# Patient Record
Sex: FEMALE | Race: WHITE | NOT HISPANIC OR LATINO | Employment: OTHER | URBAN - NONMETROPOLITAN AREA
[De-identification: names, ages, dates, MRNs, and addresses within clinical notes are randomized per-mention and may not be internally consistent; named-entity substitution may affect disease eponyms.]

---

## 2017-01-23 ENCOUNTER — TRANSCRIBE ORDERS (OUTPATIENT)
Dept: ADMINISTRATIVE | Facility: HOSPITAL | Age: 82
End: 2017-01-23

## 2017-01-23 DIAGNOSIS — M48.07 LUMBOSACRAL STENOSIS: ICD-10-CM

## 2017-01-23 DIAGNOSIS — M48.061 SPINAL STENOSIS, LUMBAR REGION, WITHOUT NEUROGENIC CLAUDICATION: ICD-10-CM

## 2017-01-23 DIAGNOSIS — M46.26 OSTEOMYELITIS OF VERTEBRA OF LUMBAR REGION (HCC): Primary | ICD-10-CM

## 2017-01-23 DIAGNOSIS — M47.27 LUMBOSACRAL RADICULOPATHY DUE TO DEGENERATIVE JOINT DISEASE OF SPINE: ICD-10-CM

## 2017-01-24 ENCOUNTER — HOSPITAL ENCOUNTER (OUTPATIENT)
Dept: INTERVENTIONAL RADIOLOGY/VASCULAR | Facility: HOSPITAL | Age: 82
Discharge: HOME | End: 2017-01-24
Attending: PHYSICAL MEDICINE & REHABILITATION | Admitting: PHYSICAL MEDICINE & REHABILITATION

## 2017-01-24 VITALS
SYSTOLIC BLOOD PRESSURE: 137 MMHG | DIASTOLIC BLOOD PRESSURE: 48 MMHG | BODY MASS INDEX: 24.99 KG/M2 | HEIGHT: 65 IN | WEIGHT: 150 LBS | HEART RATE: 54 BPM | RESPIRATION RATE: 16 BRPM | OXYGEN SATURATION: 98 % | TEMPERATURE: 97.3 F

## 2017-01-24 DIAGNOSIS — M47.27 LUMBOSACRAL RADICULOPATHY DUE TO DEGENERATIVE JOINT DISEASE OF SPINE: ICD-10-CM

## 2017-01-24 DIAGNOSIS — M46.26 OSTEOMYELITIS OF VERTEBRA OF LUMBAR REGION (HCC): ICD-10-CM

## 2017-01-24 DIAGNOSIS — M48.07 LUMBOSACRAL STENOSIS: ICD-10-CM

## 2017-01-24 DIAGNOSIS — M48.061 SPINAL STENOSIS, LUMBAR REGION, WITHOUT NEUROGENIC CLAUDICATION: ICD-10-CM

## 2017-01-24 PROCEDURE — 25010000002 METHYLPREDNISOLONE PER 80 MG: Performed by: PHYSICAL MEDICINE & REHABILITATION

## 2017-01-24 RX ORDER — LIDOCAINE HYDROCHLORIDE 10 MG/ML
INJECTION, SOLUTION INFILTRATION; PERINEURAL
Status: COMPLETED | OUTPATIENT
Start: 2017-01-24 | End: 2017-01-24

## 2017-01-24 RX ORDER — 0.9 % SODIUM CHLORIDE 0.9 %
VIAL (ML) INJECTION
Status: COMPLETED | OUTPATIENT
Start: 2017-01-24 | End: 2017-01-24

## 2017-01-24 RX ORDER — METHYLPREDNISOLONE ACETATE 80 MG/ML
INJECTION, SUSPENSION INTRA-ARTICULAR; INTRALESIONAL; INTRAMUSCULAR; SOFT TISSUE
Status: COMPLETED | OUTPATIENT
Start: 2017-01-24 | End: 2017-01-24

## 2017-01-24 RX ADMIN — METHYLPREDNISOLONE ACETATE 80 MG: 80 INJECTION, SUSPENSION INTRA-ARTICULAR; INTRALESIONAL; INTRAMUSCULAR; SOFT TISSUE at 13:03

## 2017-01-24 RX ADMIN — SODIUM CHLORIDE 2 ML: 9 INJECTION INTRAMUSCULAR; INTRAVENOUS; SUBCUTANEOUS at 13:03

## 2017-01-24 RX ADMIN — LIDOCAINE HYDROCHLORIDE 5 ML: 10 INJECTION, SOLUTION INFILTRATION; PERINEURAL at 13:01

## 2017-01-24 RX ADMIN — SODIUM CHLORIDE 3 ML: 9 INJECTION INTRAMUSCULAR; INTRAVENOUS; SUBCUTANEOUS at 13:03

## 2017-01-24 NOTE — NURSING NOTE
Discharge instructions reviewed with patient, verbalizes understanding. Copy given to patient. Patient discharged.

## 2017-04-17 ENCOUNTER — TRANSCRIBE ORDERS (OUTPATIENT)
Dept: ADMINISTRATIVE | Facility: HOSPITAL | Age: 82
End: 2017-04-17

## 2017-04-17 DIAGNOSIS — M54.16 LUMBAR RADICULOPATHY: ICD-10-CM

## 2017-04-17 DIAGNOSIS — M54.50 LOW BACK PAIN POTENTIALLY ASSOCIATED WITH RADICULOPATHY: Primary | ICD-10-CM

## 2017-04-21 ENCOUNTER — HOSPITAL ENCOUNTER (OUTPATIENT)
Dept: MRI IMAGING | Facility: HOSPITAL | Age: 82
Discharge: HOME | End: 2017-04-21
Admitting: NURSE PRACTITIONER

## 2017-04-21 DIAGNOSIS — M54.16 LUMBAR RADICULOPATHY: ICD-10-CM

## 2017-04-21 DIAGNOSIS — M54.50 LOW BACK PAIN POTENTIALLY ASSOCIATED WITH RADICULOPATHY: ICD-10-CM

## 2017-04-21 PROCEDURE — 72148 MRI LUMBAR SPINE W/O DYE: CPT

## 2017-09-14 ENCOUNTER — TRANSCRIBE ORDERS (OUTPATIENT)
Dept: ADMINISTRATIVE | Facility: HOSPITAL | Age: 82
End: 2017-09-14

## 2017-09-14 DIAGNOSIS — M54.17 RADICULOPATHY, LUMBOSACRAL REGION: Primary | ICD-10-CM

## 2017-09-21 ENCOUNTER — HOSPITAL ENCOUNTER (OUTPATIENT)
Dept: MRI IMAGING | Facility: HOSPITAL | Age: 82
Discharge: HOME | End: 2017-09-21
Admitting: NURSE PRACTITIONER

## 2017-09-21 DIAGNOSIS — M54.17 RADICULOPATHY, LUMBOSACRAL REGION: ICD-10-CM

## 2017-09-21 PROCEDURE — 72148 MRI LUMBAR SPINE W/O DYE: CPT

## 2019-03-13 ENCOUNTER — HOSPITAL ENCOUNTER (OUTPATIENT)
Facility: HOSPITAL | Age: 84
Setting detail: OBSERVATION
Discharge: HOME HEALTH | End: 2019-03-14
Attending: EMERGENCY MEDICINE | Admitting: FAMILY MEDICINE

## 2019-03-13 ENCOUNTER — APPOINTMENT (OUTPATIENT)
Dept: GENERAL RADIOLOGY | Facility: HOSPITAL | Age: 84
End: 2019-03-13

## 2019-03-13 ENCOUNTER — APPOINTMENT (OUTPATIENT)
Dept: CT IMAGING | Facility: HOSPITAL | Age: 84
End: 2019-03-13

## 2019-03-13 DIAGNOSIS — I63.9 CEREBROVASCULAR ACCIDENT (CVA), UNSPECIFIED MECHANISM (HCC): Primary | ICD-10-CM

## 2019-03-13 DIAGNOSIS — R55 SYNCOPE AND COLLAPSE: ICD-10-CM

## 2019-03-13 DIAGNOSIS — R13.10 DYSPHAGIA, UNSPECIFIED TYPE: ICD-10-CM

## 2019-03-13 LAB
ALBUMIN SERPL-MCNC: 3.9 G/DL (ref 3.5–5)
ALBUMIN/GLOB SERPL: 1.7 G/DL (ref 1.1–2.5)
ALP SERPL-CCNC: 91 U/L (ref 24–120)
ALT SERPL W P-5'-P-CCNC: 36 U/L (ref 0–54)
ANION GAP SERPL CALCULATED.3IONS-SCNC: 8 MMOL/L (ref 4–13)
AST SERPL-CCNC: 73 U/L (ref 7–45)
BASOPHILS # BLD AUTO: 0.03 10*3/MM3 (ref 0–0.2)
BASOPHILS NFR BLD AUTO: 0.5 % (ref 0–2)
BILIRUB SERPL-MCNC: 0.8 MG/DL (ref 0.1–1)
BUN BLD-MCNC: 17 MG/DL (ref 5–21)
BUN/CREAT SERPL: 22.1 (ref 7–25)
CALCIUM SPEC-SCNC: 10.4 MG/DL (ref 8.4–10.4)
CHLORIDE SERPL-SCNC: 91 MMOL/L (ref 98–110)
CO2 SERPL-SCNC: 35 MMOL/L (ref 24–31)
CREAT BLD-MCNC: 0.77 MG/DL (ref 0.5–1.4)
DEPRECATED RDW RBC AUTO: 44 FL (ref 40–54)
EOSINOPHIL # BLD AUTO: 0.41 10*3/MM3 (ref 0–0.7)
EOSINOPHIL NFR BLD AUTO: 6.2 % (ref 0–4)
ERYTHROCYTE [DISTWIDTH] IN BLOOD BY AUTOMATED COUNT: 13.6 % (ref 12–15)
GFR SERPL CREATININE-BSD FRML MDRD: 70 ML/MIN/1.73
GLOBULIN UR ELPH-MCNC: 2.3 GM/DL
GLUCOSE BLD-MCNC: 98 MG/DL (ref 70–100)
HCT VFR BLD AUTO: 35 % (ref 37–47)
HGB BLD-MCNC: 11.2 G/DL (ref 12–16)
HOLD SPECIMEN: NORMAL
HOLD SPECIMEN: NORMAL
IMM GRANULOCYTES # BLD AUTO: 0.01 10*3/MM3 (ref 0–0.05)
IMM GRANULOCYTES NFR BLD AUTO: 0.2 % (ref 0–5)
INR PPP: 0.92 (ref 0.91–1.09)
LYMPHOCYTES # BLD AUTO: 2.61 10*3/MM3 (ref 0.72–4.86)
LYMPHOCYTES NFR BLD AUTO: 39.3 % (ref 15–45)
MCH RBC QN AUTO: 28.1 PG (ref 28–32)
MCHC RBC AUTO-ENTMCNC: 32 G/DL (ref 33–36)
MCV RBC AUTO: 87.9 FL (ref 82–98)
MONOCYTES # BLD AUTO: 0.87 10*3/MM3 (ref 0.19–1.3)
MONOCYTES NFR BLD AUTO: 13.1 % (ref 4–12)
NEUTROPHILS # BLD AUTO: 2.71 10*3/MM3 (ref 1.87–8.4)
NEUTROPHILS NFR BLD AUTO: 40.7 % (ref 39–78)
NRBC BLD AUTO-RTO: 0 /100 WBC (ref 0–0)
PLATELET # BLD AUTO: 252 10*3/MM3 (ref 130–400)
PMV BLD AUTO: 12 FL (ref 6–12)
POTASSIUM BLD-SCNC: 3.2 MMOL/L (ref 3.5–5.3)
PROT SERPL-MCNC: 6.2 G/DL (ref 6.3–8.7)
PROTHROMBIN TIME: 12.6 SECONDS (ref 11.9–14.6)
RBC # BLD AUTO: 3.98 10*6/MM3 (ref 4.2–5.4)
SODIUM BLD-SCNC: 134 MMOL/L (ref 135–145)
WBC NRBC COR # BLD: 6.64 10*3/MM3 (ref 4.8–10.8)
WHOLE BLOOD HOLD SPECIMEN: NORMAL
WHOLE BLOOD HOLD SPECIMEN: NORMAL

## 2019-03-13 PROCEDURE — G0378 HOSPITAL OBSERVATION PER HR: HCPCS

## 2019-03-13 PROCEDURE — 85025 COMPLETE CBC W/AUTO DIFF WBC: CPT | Performed by: EMERGENCY MEDICINE

## 2019-03-13 PROCEDURE — 93005 ELECTROCARDIOGRAM TRACING: CPT | Performed by: EMERGENCY MEDICINE

## 2019-03-13 PROCEDURE — 73070 X-RAY EXAM OF ELBOW: CPT

## 2019-03-13 PROCEDURE — 99284 EMERGENCY DEPT VISIT MOD MDM: CPT

## 2019-03-13 PROCEDURE — 73502 X-RAY EXAM HIP UNI 2-3 VIEWS: CPT

## 2019-03-13 PROCEDURE — 70450 CT HEAD/BRAIN W/O DYE: CPT

## 2019-03-13 PROCEDURE — 93010 ELECTROCARDIOGRAM REPORT: CPT | Performed by: INTERNAL MEDICINE

## 2019-03-13 PROCEDURE — 80053 COMPREHEN METABOLIC PANEL: CPT | Performed by: EMERGENCY MEDICINE

## 2019-03-13 PROCEDURE — 85610 PROTHROMBIN TIME: CPT | Performed by: EMERGENCY MEDICINE

## 2019-03-13 PROCEDURE — 72125 CT NECK SPINE W/O DYE: CPT

## 2019-03-13 PROCEDURE — 94799 UNLISTED PULMONARY SVC/PX: CPT

## 2019-03-13 PROCEDURE — 94760 N-INVAS EAR/PLS OXIMETRY 1: CPT

## 2019-03-13 RX ORDER — DULOXETIN HYDROCHLORIDE 60 MG/1
60 CAPSULE, DELAYED RELEASE ORAL 2 TIMES DAILY
COMMUNITY

## 2019-03-13 RX ORDER — LATANOPROST 50 UG/ML
1 SOLUTION/ DROPS OPHTHALMIC NIGHTLY
Status: DISCONTINUED | OUTPATIENT
Start: 2019-03-13 | End: 2019-03-14 | Stop reason: HOSPADM

## 2019-03-13 RX ORDER — ONDANSETRON 2 MG/ML
4 INJECTION INTRAMUSCULAR; INTRAVENOUS EVERY 6 HOURS PRN
Status: DISCONTINUED | OUTPATIENT
Start: 2019-03-13 | End: 2019-03-14 | Stop reason: HOSPADM

## 2019-03-13 RX ORDER — AMLODIPINE BESYLATE 10 MG/1
10 TABLET ORAL DAILY
COMMUNITY

## 2019-03-13 RX ORDER — OMEPRAZOLE 20 MG/1
20 CAPSULE, DELAYED RELEASE ORAL DAILY
COMMUNITY

## 2019-03-13 RX ORDER — OXYCODONE HCL 20 MG/1
20 TABLET, FILM COATED, EXTENDED RELEASE ORAL EVERY 12 HOURS SCHEDULED
Status: DISCONTINUED | OUTPATIENT
Start: 2019-03-13 | End: 2019-03-14 | Stop reason: HOSPADM

## 2019-03-13 RX ORDER — SODIUM CHLORIDE 0.9 % (FLUSH) 0.9 %
3-10 SYRINGE (ML) INJECTION AS NEEDED
Status: DISCONTINUED | OUTPATIENT
Start: 2019-03-13 | End: 2019-03-14 | Stop reason: HOSPADM

## 2019-03-13 RX ORDER — DORZOLAMIDE HCL 20 MG/ML
1 SOLUTION/ DROPS OPHTHALMIC 2 TIMES DAILY
COMMUNITY

## 2019-03-13 RX ORDER — ACETAMINOPHEN 325 MG/1
650 TABLET ORAL EVERY 4 HOURS PRN
Status: DISCONTINUED | OUTPATIENT
Start: 2019-03-13 | End: 2019-03-14 | Stop reason: HOSPADM

## 2019-03-13 RX ORDER — AMLODIPINE BESYLATE 5 MG/1
5 TABLET ORAL DAILY
Status: DISCONTINUED | OUTPATIENT
Start: 2019-03-13 | End: 2019-03-14 | Stop reason: HOSPADM

## 2019-03-13 RX ORDER — ASPIRIN 81 MG/1
81 TABLET ORAL DAILY
Status: DISCONTINUED | OUTPATIENT
Start: 2019-03-13 | End: 2019-03-14

## 2019-03-13 RX ORDER — ATORVASTATIN CALCIUM 40 MG/1
40 TABLET, FILM COATED ORAL NIGHTLY
Status: DISCONTINUED | OUTPATIENT
Start: 2019-03-13 | End: 2019-03-14 | Stop reason: HOSPADM

## 2019-03-13 RX ORDER — DORZOLAMIDE HCL 20 MG/ML
1 SOLUTION/ DROPS OPHTHALMIC 3 TIMES DAILY
Status: DISCONTINUED | OUTPATIENT
Start: 2019-03-13 | End: 2019-03-14 | Stop reason: HOSPADM

## 2019-03-13 RX ORDER — DONEPEZIL HYDROCHLORIDE 10 MG/1
10 TABLET, FILM COATED ORAL NIGHTLY
Status: DISCONTINUED | OUTPATIENT
Start: 2019-03-13 | End: 2019-03-14 | Stop reason: HOSPADM

## 2019-03-13 RX ORDER — PREGABALIN 75 MG/1
150 CAPSULE ORAL EVERY 12 HOURS SCHEDULED
Status: DISCONTINUED | OUTPATIENT
Start: 2019-03-13 | End: 2019-03-14 | Stop reason: HOSPADM

## 2019-03-13 RX ORDER — PREGABALIN 150 MG/1
150 CAPSULE ORAL 4 TIMES DAILY
Status: ON HOLD | COMMUNITY
End: 2019-03-14 | Stop reason: SDUPTHER

## 2019-03-13 RX ORDER — DONEPEZIL HYDROCHLORIDE 10 MG/1
10 TABLET, FILM COATED ORAL NIGHTLY
COMMUNITY

## 2019-03-13 RX ORDER — ATENOLOL 25 MG/1
12.5 TABLET ORAL DAILY
Status: DISCONTINUED | OUTPATIENT
Start: 2019-03-13 | End: 2019-03-14 | Stop reason: HOSPADM

## 2019-03-13 RX ORDER — DULOXETIN HYDROCHLORIDE 30 MG/1
60 CAPSULE, DELAYED RELEASE ORAL DAILY
Status: DISCONTINUED | OUTPATIENT
Start: 2019-03-13 | End: 2019-03-14 | Stop reason: HOSPADM

## 2019-03-13 RX ORDER — POTASSIUM CHLORIDE 750 MG/1
40 CAPSULE, EXTENDED RELEASE ORAL ONCE
Status: COMPLETED | OUTPATIENT
Start: 2019-03-13 | End: 2019-03-13

## 2019-03-13 RX ORDER — HYDROCHLOROTHIAZIDE 25 MG/1
25 TABLET ORAL DAILY
COMMUNITY

## 2019-03-13 RX ORDER — PANTOPRAZOLE SODIUM 40 MG/1
40 TABLET, DELAYED RELEASE ORAL EVERY MORNING
Status: DISCONTINUED | OUTPATIENT
Start: 2019-03-14 | End: 2019-03-14 | Stop reason: HOSPADM

## 2019-03-13 RX ORDER — ATENOLOL 25 MG/1
12.5 TABLET ORAL DAILY
COMMUNITY

## 2019-03-13 RX ORDER — POTASSIUM CHLORIDE 750 MG/1
10 TABLET, FILM COATED, EXTENDED RELEASE ORAL 2 TIMES DAILY
COMMUNITY

## 2019-03-13 RX ORDER — SODIUM CHLORIDE 0.9 % (FLUSH) 0.9 %
3 SYRINGE (ML) INJECTION EVERY 12 HOURS SCHEDULED
Status: DISCONTINUED | OUTPATIENT
Start: 2019-03-13 | End: 2019-03-14 | Stop reason: HOSPADM

## 2019-03-13 RX ORDER — ETODOLAC 400 MG/1
400 TABLET, FILM COATED ORAL 2 TIMES DAILY
COMMUNITY

## 2019-03-13 RX ORDER — LATANOPROST 50 UG/ML
1 SOLUTION/ DROPS OPHTHALMIC NIGHTLY
COMMUNITY

## 2019-03-13 RX ADMIN — ACETAMINOPHEN 650 MG: 325 TABLET, FILM COATED ORAL at 21:07

## 2019-03-13 RX ADMIN — POTASSIUM CHLORIDE 40 MEQ: 750 CAPSULE, EXTENDED RELEASE ORAL at 21:20

## 2019-03-13 RX ADMIN — SODIUM CHLORIDE, PRESERVATIVE FREE 3 ML: 5 INJECTION INTRAVENOUS at 21:08

## 2019-03-13 RX ADMIN — LATANOPROST 1 DROP: 50 SOLUTION OPHTHALMIC at 21:07

## 2019-03-13 RX ADMIN — DONEPEZIL HYDROCHLORIDE 10 MG: 10 TABLET, FILM COATED ORAL at 21:06

## 2019-03-13 RX ADMIN — OXYCODONE HYDROCHLORIDE 20 MG: 20 TABLET, FILM COATED, EXTENDED RELEASE ORAL at 22:42

## 2019-03-13 RX ADMIN — ATORVASTATIN CALCIUM 40 MG: 40 TABLET, FILM COATED ORAL at 21:06

## 2019-03-13 RX ADMIN — PREGABALIN 150 MG: 75 CAPSULE ORAL at 22:42

## 2019-03-13 RX ADMIN — ASPIRIN 81 MG: 81 TABLET, DELAYED RELEASE ORAL at 21:06

## 2019-03-13 NOTE — H&P
[facility] Medicine Services  HISTORY AND PHYSICAL    Date of Admission: 3/13/2019  Primary Care Physician: Provider, No Known    Subjective     Chief Complaint: Syncope    History of Present Illness   Miss Bella is a 89 year old lady with a history of osteoarthritis, hypertension, and alzheimer's dementia.  She passed out yesterday and fell.  She went to see her doctor in [facility] and they ordered an MRI.  This showed a subacute stroke and she was transferred to the [facility] ER for further evaluation.    She denies focal weakness of deficit.  She denies facial drooping or difficulty speaking.        Review of Systems   Unable to perform ROS: Dementia          Otherwise complete ROS reviewed and negative except as mentioned in the HPI.    Past Medical History:   Past Medical History:   Diagnosis Date   • Alzheimer disease    • Glaucoma    • Hypertension    • Macular degeneration    • Osteoarthritis    • Sciatica      Past Surgical History:No past surgical history on file.  Social History:  reports that  has never smoked. She does not have any smokeless tobacco history on file. She reports that she does not drink alcohol or use drugs.    Family History: Dementia    Allergies:  Allergies   Allergen Reactions   • Meperidine        Medications:  Prior to Admission medications    Medication Sig Start Date End Date Taking? Authorizing Provider   amLODIPine (NORVASC) 10 MG tablet Take 10 mg by mouth Daily.   Yes Monika Roth MD   atenolol (TENORMIN) 25 MG tablet Take 12.5 mg by mouth Daily.   Yes Monika Roth MD   donepezil (ARICEPT) 10 MG tablet Take 10 mg by mouth Every Night.   Yes Monika Roth MD   dorzolamide (TRUSOPT) 2 % ophthalmic solution 1 drop 3 (Three) Times a Day.   Yes Monika Roth MD   DULoxetine (CYMBALTA) 60 MG capsule Take 60 mg by mouth Daily.   Yes Monika Roth MD   etodolac (LODINE) 400 MG tablet Take 400 mg by  "mouth 2 (Two) Times a Day.   Yes Monika Roth MD   hydrochlorothiazide (HYDRODIURIL) 25 MG tablet Take 25 mg by mouth Daily.   Yes Provider, Historical, MD   latanoprost (XALATAN) 0.005 % ophthalmic solution 1 drop Every Night.   Yes Provider, Historical, MD   omeprazole (priLOSEC) 20 MG capsule Take 20 mg by mouth Daily.   Yes Monika Roth MD   oxyCODONE ER 18 MG capsule extended-release 12 hour  Take  by mouth.   Yes Monika Roth MD   potassium chloride (K-DUR) 10 MEQ CR tablet Take 10 mEq by mouth 2 (Two) Times a Day.   Yes Provider, Historical, MD   pregabalin (LYRICA) 100 MG capsule Take 150 mg by mouth 2 (Two) Times a Day.   Yes Provider, Historical, MD   Probiotic Product (PROBIOTIC-10) capsule Take 1 capsule by mouth Daily.   Yes Provider, Historical, MD   Zolpidem Tartrate 10 MG sublingual tablet Place  under the tongue.   Yes Monika Roth MD     Objective     Vital Signs: /78   Pulse 59   Temp 98.1 °F (36.7 °C) (Oral)   Resp 16   Ht 165.1 cm (65\")   Wt 62.1 kg (137 lb)   SpO2 95%   BMI 22.80 kg/m²   Physical Exam   Constitutional: She appears well-developed and well-nourished.   HENT:   Head: Normocephalic and atraumatic.   Right Ear: External ear normal.   Left Ear: External ear normal.   Nose: Nose normal.   Mouth/Throat: Oropharynx is clear and moist.   Eyes: Conjunctivae and EOM are normal.   Neck: Normal range of motion. Neck supple.   Cardiovascular: Normal rate, regular rhythm and normal heart sounds.   Pulmonary/Chest: Effort normal and breath sounds normal.   Abdominal: Soft. Bowel sounds are normal. She exhibits no distension. There is no tenderness.   Musculoskeletal: Normal range of motion.   Neurological: She is alert. She has normal strength and normal reflexes. She is disoriented. She displays no tremor. No cranial nerve deficit. She exhibits normal muscle tone. She displays no seizure activity. Coordination normal.   Skin: Skin is warm and " dry.   Psychiatric: She has a normal mood and affect. Her speech is normal and behavior is normal. Cognition and memory are impaired.           Results Reviewed:  Lab Results (last 24 hours)     Procedure Component Value Units Date/Time    [place]g Draw [[id number]] Collected:  03/13/19 1709    Specimen:  Blood Updated:  03/13/19 1816    Narrative:       The following orders were created for panel order Amberg Draw.  Procedure                               Abnormality         Status                     ---------                               -----------         ------                     Light Blue Top[[id number]]                                    Final result               Green Top (Gel)[[id number]]                                   Final result               Lavender Top[[id number]]                                      Final result               Red Top[[id number]]                                           Final result                 Please view results for these tests on the individual orders.    Light Blue Top [[id number]] Collected:  03/13/19 1709    Specimen:  Blood Updated:  03/13/19 1816     Extra Tube hold for add-on     Comment: Auto resulted       Green Top (Gel) [[id number]] Collected:  03/13/19 1709    Specimen:  Blood Updated:  03/13/19 1816     Extra Tube Hold for add-ons.     Comment: Auto resulted.       Lavender Top [[id number]] Collected:  03/13/19 1709    Specimen:  Blood Updated:  03/13/19 1816     Extra Tube hold for add-on     Comment: Auto resulted       Red Top [[id number]] Collected:  03/13/19 1709    Specimen:  Blood Updated:  03/13/19 1816     Extra Tube Hold for add-ons.     Comment: Auto resulted.       Comprehensive Metabolic Panel [[id number]]  (Abnormal) Collected:  03/13/19 1709    Specimen:  Blood Updated:  03/13/19 1739     Glucose 98 mg/dL      BUN 17 mg/dL      Creatinine 0.77 mg/dL      Sodium 134 mmol/L      Potassium 3.2 mmol/L      Chloride 91 mmol/L      CO2 35.0 mmol/L      Calcium 10.4  mg/dL      Total Protein 6.2 g/dL      Albumin 3.90 g/dL      ALT (SGPT) 36 U/L      AST (SGOT) 73 U/L      Alkaline Phosphatase 91 U/L      Total Bilirubin 0.8 mg/dL      eGFR Non African Amer 70 mL/min/1.73      Globulin 2.3 gm/dL      A/G Ratio 1.7 g/dL      BUN/Creatinine Ratio 22.1     Anion Gap 8.0 mmol/L     Narrative:       GFR Normal >60  Chronic Kidney Disease <60  Kidney Failure <15    Protime-INR [[id number]]  (Normal) Collected:  03/13/19 1709    Specimen:  Blood Updated:  03/13/19 1736     Protime 12.6 Seconds      INR 0.92    CBC & Differential [[id number]] Collected:  03/13/19 1709    Specimen:  Blood Updated:  03/13/19 1726    Narrative:       The following orders were created for panel order CBC & Differential.  Procedure                               Abnormality         Status                     ---------                               -----------         ------                     CBC Auto Differential[[id number]]         Abnormal            Final result                 Please view results for these tests on the individual orders.    CBC Auto Differential [[id number]]  (Abnormal) Collected:  03/13/19 1709    Specimen:  Blood Updated:  03/13/19 1726     WBC 6.64 10*3/mm3      RBC 3.98 10*6/mm3      Hemoglobin 11.2 g/dL      Hematocrit 35.0 %      MCV 87.9 fL      MCH 28.1 pg      MCHC 32.0 g/dL      RDW 13.6 %      RDW-SD 44.0 fl      MPV 12.0 fL      Platelets 252 10*3/mm3      Neutrophil % 40.7 %      Lymphocyte % 39.3 %      Monocyte % 13.1 %      Eosinophil % 6.2 %      Basophil % 0.5 %      Immature Grans % 0.2 %      Neutrophils, Absolute 2.71 10*3/mm3      Lymphocytes, Absolute 2.61 10*3/mm3      Monocytes, Absolute 0.87 10*3/mm3      Eosinophils, Absolute 0.41 10*3/mm3      Basophils, Absolute 0.03 10*3/mm3      Immature Grans, Absolute 0.01 10*3/mm3      nRBC 0.0 /100 WBC         Imaging Results (last 24 hours)     ** No results found for the last 24 hours. **        I have personally reviewed  and interpreted the radiology studies and ECG obtained at time of admission.       MRI at OSH report:      FLAIR study demonstrates obvious edema in the anterior temporal horn on the right side that is a subacute brain injury, probably 4 or 5 days old, that involved the anterior temporal horn.    Assessment / Plan     Assessment:   Active Hospital Problems    Diagnosis   • Cerebrovascular accident (CVA) (CMS/[facility])     1.  Subacute CVA  -ASA  -Lipitor  -Consult Neuro  -SLP  -PT/OT  -Echo  -Carotids  -Lipid Panel  -A1C    2.  Fall/trauma  -Will do CT Head/Neck   -XR right hip/elbow    3.  Syncope  -Orthostatics  -Echo    4.  HTN  -Norvasc  -Atenolol  -HCTZ    5.  Hypokalemia  -PO K    6.  GERD  -Protonix    7.  Glaucoma  -Continue eye drops    Code Status:      I discussed the patient's findings and my recommendations with the patient, patient's family    Estimated length of stay 1-2 days    Kareem Aguilar MD   03/13/19   6:39 PM

## 2019-03-13 NOTE — ED PROVIDER NOTES
Subjective   PT WITH FALL SYNCOPE WITH         Cerebrovascular Accident   The primary symptoms include loss of consciousness, altered mental status, loss of sensation and speech change. Primary symptoms do not include dizziness, paresthesias, nausea or vomiting. The symptoms began 6 to 12 hours ago.   The symptoms are improving.   Additional symptoms do not include neck stiffness, weakness, pain, hallucinations, nystagmus, taste disturbance, vertigo, anxiety, irritability or dysphoric mood. Medical issues also include cerebral vascular accident. Medical issues do not include hypertension or recent surgery. Workup history includes MRI.       Review of Systems   Constitutional: Negative.  Negative for irritability.   HENT: Negative.    Eyes: Negative.    Respiratory: Negative.    Cardiovascular: Negative.    Gastrointestinal: Negative.  Negative for nausea and vomiting.   Musculoskeletal: Negative.  Negative for back pain, neck pain and neck stiffness.   Skin: Negative.    Neurological: Positive for speech change and loss of consciousness. Negative for dizziness, vertigo, weakness and paresthesias.   Psychiatric/Behavioral: Negative for dysphoric mood and hallucinations.   All other systems reviewed and are negative.      No past medical history on file.    Allergies   Allergen Reactions   • Meperidine        No past surgical history on file.    No family history on file.    Social History     Socioeconomic History   • Marital status:      Spouse name: Not on file   • Number of children: Not on file   • Years of education: Not on file   • Highest education level: Not on file           Objective   Physical Exam   Constitutional: She is oriented to person, place, and time. She appears well-developed and well-nourished.   HENT:   Head: Normocephalic.   Right Ear: External ear normal.   Eyes: Conjunctivae are normal. Pupils are equal, round, and reactive to light.   Neck: Normal range of motion. Neck supple.    Cardiovascular: Normal rate, regular rhythm, normal heart sounds and intact distal pulses. PMI is not displaced. Exam reveals no decreased pulses.   No murmur heard.  Pulmonary/Chest: Effort normal and breath sounds normal. No accessory muscle usage. No tachypnea. No respiratory distress. She has no decreased breath sounds. She has no wheezes. She has no rales. She exhibits no tenderness.   Abdominal: Soft. Bowel sounds are normal. There is no tenderness.   Musculoskeletal: Normal range of motion. She exhibits no edema or tenderness.   Lower extremity exam bilaterally is unremarkable.  There is no right or left calf tenderness .  There is no palpable venous cord.  No obvious difference in the size of the legs.  No pitting edema.  The dorsalis pedis and posterior tibial femoral and popliteal pulses are palpable and +2 bilaterally.  Homans sign is negative   Neurological: She is alert and oriented to person, place, and time. She has normal reflexes. No cranial nerve deficit.   Skin: Skin is warm. No rash noted. No erythema.   Nursing note and vitals reviewed.      Procedures           ED Course  ED Course as of Mar 13 1803   Wed Mar 13, 2019   1801 Patient with a history of CVA on the MRI done at outlying facility sent over here for evaluation and had a near syncopal episode versus syncope at home was found on the floor MRI was done today consistent with a subacute stroke I have discussed this case the family and with the hospitalist will admit the patient to hospital for further evaluation   [TS]      ED Course User Index  [TS] Suleman Overton MD                  [facility]      Final diagnoses:   Cerebrovascular accident (CVA), unspecified mechanism (CMS/[facility])   Syncope and collapse            Suleman Overton MD  03/13/19 1804

## 2019-03-14 ENCOUNTER — APPOINTMENT (OUTPATIENT)
Dept: ULTRASOUND IMAGING | Facility: HOSPITAL | Age: 84
End: 2019-03-14

## 2019-03-14 VITALS
TEMPERATURE: 98.2 F | SYSTOLIC BLOOD PRESSURE: 143 MMHG | OXYGEN SATURATION: 91 % | HEIGHT: 65 IN | HEART RATE: 70 BPM | DIASTOLIC BLOOD PRESSURE: 61 MMHG | WEIGHT: 137 LBS | RESPIRATION RATE: 16 BRPM | BODY MASS INDEX: 22.82 KG/M2

## 2019-03-14 PROBLEM — G30.1 DEMENTIA OF THE ALZHEIMER'S TYPE WITH LATE ONSET WITHOUT BEHAVIORAL DISTURBANCE (HCC): Status: ACTIVE | Noted: 2019-03-14

## 2019-03-14 PROBLEM — I10 HTN (HYPERTENSION): Status: ACTIVE | Noted: 2019-03-14

## 2019-03-14 PROBLEM — E87.6 HYPOKALEMIA: Status: ACTIVE | Noted: 2019-03-14

## 2019-03-14 PROBLEM — R90.0 INTRACRANIAL SPACE-OCCUPYING LESION: Status: ACTIVE | Noted: 2019-03-14

## 2019-03-14 PROBLEM — F02.80 DEMENTIA OF THE ALZHEIMER'S TYPE WITH LATE ONSET WITHOUT BEHAVIORAL DISTURBANCE (HCC): Status: ACTIVE | Noted: 2019-03-14
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
## 2019-03-14 PROBLEM — H40.9 GLAUCOMA: Status: ACTIVE | Noted: 2019-03-14

## 2019-03-14 PROBLEM — S52.041A: Status: ACTIVE | Noted: 2019-03-14

## 2019-03-14 LAB
ANION GAP SERPL CALCULATED.3IONS-SCNC: 6 MMOL/L (ref 4–13)
ARTICHOKE IGE QN: 51 MG/DL (ref 0–99)
BUN BLD-MCNC: 15 MG/DL (ref 5–21)
BUN/CREAT SERPL: 22.1 (ref 7–25)
CALCIUM SPEC-SCNC: 9.8 MG/DL (ref 8.4–10.4)
CHLORIDE SERPL-SCNC: 98 MMOL/L (ref 98–110)
CHOLEST SERPL-MCNC: 110 MG/DL (ref 130–200)
CO2 SERPL-SCNC: 35 MMOL/L (ref 24–31)
CREAT BLD-MCNC: 0.68 MG/DL (ref 0.5–1.4)
DEPRECATED RDW RBC AUTO: 44.8 FL (ref 40–54)
ERYTHROCYTE [DISTWIDTH] IN BLOOD BY AUTOMATED COUNT: 13.7 % (ref 12–15)
GFR SERPL CREATININE-BSD FRML MDRD: 81 ML/MIN/1.73
GLUCOSE BLD-MCNC: 95 MG/DL (ref 70–100)
HBA1C MFR BLD: 5 %
HCT VFR BLD AUTO: 30.9 % (ref 37–47)
HDLC SERPL-MCNC: 48 MG/DL
HGB BLD-MCNC: 9.9 G/DL (ref 12–16)
LDLC/HDLC SERPL: 1.1 {RATIO}
MCH RBC QN AUTO: 28.6 PG (ref 28–32)
MCHC RBC AUTO-ENTMCNC: 32 G/DL (ref 33–36)
MCV RBC AUTO: 89.3 FL (ref 82–98)
PLATELET # BLD AUTO: 226 10*3/MM3 (ref 130–400)
PMV BLD AUTO: 12.1 FL (ref 6–12)
POTASSIUM BLD-SCNC: 3.7 MMOL/L (ref 3.5–5.3)
RBC # BLD AUTO: 3.46 10*6/MM3 (ref 4.2–5.4)
SODIUM BLD-SCNC: 139 MMOL/L (ref 135–145)
TRIGL SERPL-MCNC: 46 MG/DL (ref 0–149)
WBC NRBC COR # BLD: 4.64 10*3/MM3 (ref 4.8–10.8)

## 2019-03-14 PROCEDURE — 93880 EXTRACRANIAL BILAT STUDY: CPT | Performed by: SURGERY

## 2019-03-14 PROCEDURE — 93880 EXTRACRANIAL BILAT STUDY: CPT

## 2019-03-14 PROCEDURE — 80061 LIPID PANEL: CPT | Performed by: NURSE PRACTITIONER

## 2019-03-14 PROCEDURE — 83036 HEMOGLOBIN GLYCOSYLATED A1C: CPT | Performed by: NURSE PRACTITIONER

## 2019-03-14 PROCEDURE — G0378 HOSPITAL OBSERVATION PER HR: HCPCS

## 2019-03-14 PROCEDURE — 85027 COMPLETE CBC AUTOMATED: CPT | Performed by: NURSE PRACTITIONER

## 2019-03-14 PROCEDURE — 99219 PR INITIAL OBSERVATION CARE/DAY 50 MINUTES: CPT | Performed by: NEUROLOGICAL SURGERY

## 2019-03-14 PROCEDURE — 99204 OFFICE O/P NEW MOD 45 MIN: CPT | Performed by: PSYCHIATRY & NEUROLOGY

## 2019-03-14 PROCEDURE — 80048 BASIC METABOLIC PNL TOTAL CA: CPT | Performed by: NURSE PRACTITIONER

## 2019-03-14 PROCEDURE — 92610 EVALUATE SWALLOWING FUNCTION: CPT | Performed by: SPEECH-LANGUAGE PATHOLOGIST

## 2019-03-14 RX ORDER — PHENOL 1.4 %
600 AEROSOL, SPRAY (ML) MUCOUS MEMBRANE DAILY
COMMUNITY

## 2019-03-14 RX ORDER — PREGABALIN 150 MG/1
150 CAPSULE ORAL 2 TIMES DAILY
Start: 2019-03-14

## 2019-03-14 RX ORDER — MULTIPLE VITAMINS W/ MINERALS TAB 9MG-400MCG
1 TAB ORAL DAILY
COMMUNITY

## 2019-03-14 RX ADMIN — AMLODIPINE BESYLATE 5 MG: 5 TABLET ORAL at 10:22

## 2019-03-14 RX ADMIN — PANTOPRAZOLE SODIUM 40 MG: 40 TABLET, DELAYED RELEASE ORAL at 10:24

## 2019-03-14 RX ADMIN — SODIUM CHLORIDE, PRESERVATIVE FREE 3 ML: 5 INJECTION INTRAVENOUS at 11:25

## 2019-03-14 RX ADMIN — DORZOLAMIDE HYDROCHLORIDE 1 DROP: 20 SOLUTION/ DROPS OPHTHALMIC at 11:22

## 2019-03-14 RX ADMIN — PREGABALIN 150 MG: 75 CAPSULE ORAL at 10:21

## 2019-03-14 RX ADMIN — OXYCODONE HYDROCHLORIDE 20 MG: 20 TABLET, FILM COATED, EXTENDED RELEASE ORAL at 10:22

## 2019-03-14 RX ADMIN — DULOXETINE HYDROCHLORIDE 60 MG: 30 CAPSULE, DELAYED RELEASE ORAL at 10:23

## 2019-03-14 RX ADMIN — ATENOLOL 12.5 MG: 25 TABLET ORAL at 10:22

## 2019-03-14 NOTE — PROGRESS NOTES
Discharge Planning Assessment   [place]     Patient Name: Felisha Bella  MRN: [id number]  Today's Date: 3/14/2019    Admit Date: 3/13/2019    Discharge Needs Assessment     Row Name 03/14/19 1525       Living Environment    Lives With  alone    Current Living Arrangements  home/apartment/condo    Primary Care Provided by  self;child(edison)    Provides Primary Care For  no one, unable/limited ability to care for self    Family Caregiver if Needed  child(edison), adult    Family Caregiver Names  Kimi    Quality of Family Relationships  helpful;involved;supportive    Able to Return to Prior Arrangements  yes       Resource/Environmental Concerns    Resource/Environmental Concerns  none       Transition Planning    Patient/Family Anticipates Transition to  home with family;home with help/services    Patient/Family Anticipated Services at Transition  home health care    Transportation Anticipated  family or friend will provide       Discharge Needs Assessment    Readmission Within the Last 30 Days  no previous admission in last 30 days    Concerns to be Addressed  adjustment to diagnosis/illness;care coordination/care conferences    Equipment Currently Used at Home  cane, straight;wheelchair;walker, rolling    Outpatient/Agency/Support Group Needs  homecare agency    Discharge Facility/Level of Care Needs  home with home health    Current Discharge Risk  lives alone    Discharge Coordination/Progress  Pt lives at home alone and is followed by [facility]. Confirmed with Janie [phone]. Daughter is going to stay with pt at night for awhile until she is able to be on her own. Plan is for pt to return home at d/c.         Discharge Plan    No documentation.       Destination      No service coordination in this encounter.      Durable Medical Equipment      No service coordination in this encounter.      Dialysis/Infusion      No service coordination in this encounter.      Home Medical Care      Service Provider  Request Status Selected Services Address Phone Number Fax Number    [facility] Pending - No Request Sent N/A [address] [place] [address] [phone]      Community Resources      No service coordination in this encounter.          Demographic Summary    No documentation.       Functional Status    No documentation.       Psychosocial    No documentation.       Abuse/Neglect    No documentation.       Legal    No documentation.       Substance Abuse    No documentation.       Patient Forms    No documentation.           CHELSEA Gonsales

## 2019-03-14 NOTE — PLAN OF CARE
Problem: Patient Care Overview  Goal: Plan of Care Review  Outcome: Ongoing (interventions implemented as appropriate)   03/14/19 0659   Coping/Psychosocial   Plan of Care Reviewed With patient;daughter   Plan of Care Review   Progress no change   OTHER   Outcome Summary Pt oriented x4 NIH =0, Tele on, up x1 to BSC. VSS, safety maintained, slight coughing noted after sips of water and pills, NPO status initated, speech consult placed per protocol, will continue to monitor       Problem: Fall Risk (Adult)  Goal: Identify Related Risk Factors and Signs and Symptoms  Outcome: Outcome(s) achieved Date Met: 03/14/19    Goal: Absence of Fall  Outcome: Ongoing (interventions implemented as appropriate)      Problem: Stroke (Ischemic) (Adult)  Goal: Signs and Symptoms of Listed Potential Problems Will be Absent, Minimized or Managed (Stroke)  Outcome: Ongoing (interventions implemented as appropriate)

## 2019-03-14 NOTE — PLAN OF CARE
Problem: Patient Care Overview  Goal: Plan of Care Review  Outcome: Ongoing (interventions implemented as appropriate)   03/14/19 1112   Coping/Psychosocial   Plan of Care Reviewed With patient;daughter;other (see comments)  (NOEL Sahu )   Plan of Care Review   Progress no change  (Initial Evaluation )   OTHER   Outcome Summary Clinical swallow evaluation completed. Patient was alert and cooperative sitting in chair. Daughter's present during evaluation. Oral motor assessment revealed age related generalized weakness which was functional for patient. Patient completed regular solid trials and thin liquid trials. No overt s/s were observed. Functional mastication of solid trial with clearance of residue post swallow. ST cannot fully rule out aspiration but feels that patient is not at a high risk when standard swallow precautions are followed. ST recommends: 1) Regular diet 2) Thin liquids 3) Meds whole with thin liquids. ST to sign off at this time. If concerns arise please reconsult ST.

## 2019-03-14 NOTE — PLAN OF CARE
Problem: Patient Care Overview  Goal: Plan of Care Review  Outcome: Ongoing (interventions implemented as appropriate)   03/14/19 1051   Coping/Psychosocial   Plan of Care Reviewed With other (see comments)   Plan of Care Review   Progress no change   OTHER   Outcome Summary OT screening completed. Spoke with PT, family reports pt is at baseline function and is being discharged today with HH. OT to defer eval.

## 2019-03-14 NOTE — CONSULTS
Neurology Consult Note    Referring Provider: Dr. MARTHA Aguilar  Reason for Consultation: confusion      History of present illness:      This is a 89-year-old female.  She is somewhat of a poor historian secondary to severe Alzheimer's disease; however, her daughter is at the bedside and assist in the history.  Reportedly the patient lives alone but is checked on frequently by family.  She was found 2 days ago on the floor.  She was confused and had urinary incontinence.  She was seen at an outside facility where there was concern for an ischemic stroke.  She underwent an MRI which once again showed some concern in the right sylvian fissure.  She was then transferred here for further evaluation.  Neurosurgery has Denis seen her and is declared that there are no signs of ischemic stroke but instead some subacute blood products in the right sylvian fissure.  The patient has no focal findings.  She remains somewhat confused which is her baseline.  She has no slurred speech.  She has no unilateral motor deficits.  She has no unilateral sensory deficits.  She has no facial drooping.      Past Medical History:   Diagnosis Date   • Alzheimer disease    • Glaucoma    • Hypertension    • Macular degeneration    • Osteoarthritis    • Sciatica        Allergies   Allergen Reactions   • Meperidine      No current facility-administered medications on file prior to encounter.      Current Outpatient Medications on File Prior to Encounter   Medication Sig   • amLODIPine (NORVASC) 10 MG tablet Take 10 mg by mouth Daily.   • atenolol (TENORMIN) 25 MG tablet Take 12.5 mg by mouth Daily.   • donepezil (ARICEPT) 10 MG tablet Take 10 mg by mouth Every Night.   • dorzolamide (TRUSOPT) 2 % ophthalmic solution 1 drop 3 (Three) Times a Day.   • DULoxetine (CYMBALTA) 60 MG capsule Take 60 mg by mouth Daily.   • etodolac (LODINE) 400 MG tablet Take 400 mg by mouth 2 (Two) Times a Day.   • hydrochlorothiazide (HYDRODIURIL) 25 MG tablet Take 25 mg  by mouth Daily.   • latanoprost (XALATAN) 0.005 % ophthalmic solution 1 drop Every Night.   • omeprazole (priLOSEC) 20 MG capsule Take 20 mg by mouth Daily.   • oxyCODONE ER 18 MG capsule extended-release 12 hour  Take 1 tablet by mouth 2 (Two) Times a Day.   • potassium chloride (K-DUR) 10 MEQ CR tablet Take 10 mEq by mouth 2 (Two) Times a Day.   • pregabalin (LYRICA) 100 MG capsule Take 150 mg by mouth 2 (Two) Times a Day.   • Probiotic Product (PROBIOTIC-10) capsule Take 1 capsule by mouth Daily.   • Zolpidem Tartrate 10 MG sublingual tablet Place 10 mg under the tongue Every Night.       Social History     Socioeconomic History   • Marital status:      Spouse name: Not on file   • Number of children: Not on file   • Years of education: Not on file   • Highest education level: Not on file   Social Needs   • Financial resource strain: Not on file   • Food insecurity - worry: Not on file   • Food insecurity - inability: Not on file   • Transportation needs - medical: Not on file   • Transportation needs - non-medical: Not on file   Occupational History   • Not on file   Tobacco Use   • Smoking status: Never Smoker   Substance and Sexual Activity   • Alcohol use: No     Frequency: Never   • Drug use: No   • Sexual activity: Defer   Other Topics Concern   • Not on file   Social History Narrative   • Not on file     Family History   Problem Relation Age of Onset   • Heart disease Mother    • Alzheimer's disease Sister    • Heart disease Brother    • Cancer Brother        Review of Systems  A 14 point review of systems was reviewed and was negative except for confusion    Vital Signs   Temp:  [98 °F (36.7 °C)-98.5 °F (36.9 °C)] 98.2 °F (36.8 °C)  Heart Rate:  [59-70] 70  Resp:  [16-18] 16  BP: (109-163)/(49-78) 143/61    General Exam:  Head:  Normal cephalic, atraumatic  HEENT:  Neck supple  Fundoscopic Exam:  No signs of disc edema  CVS:  Regular rate and rhythm.  No murmurs  Carotid Examination:  No  bruits  Lungs:  Clear to auscultation  Abdomen:  Non-tender, Non-distended  Extremities:  No signs of peripheral edema  Skin:  No rashes    Neurologic Exam:    Mental Status:    -Awake, Alert, Oriented X 3  -No word finding difficulties  -No aphasia  -No dysarthria  -Follows simple and complex commands    CN II:  Visual fields full.  Pupils equally reactive to light  CN III, IV, VI:  Extraocular Muscles full with no signs of nystagmus  CN V:  Facial sensory is symmetric with no asymmetries.  CN VII:  Facial motor symmetric  CN VIII:  Gross hearing intact bilaterally  CN IX:  Palate elevates symmetrically  CN X:  Palate elevates symmetrically  CN XI:  Shoulder shrug symmetric  CN XII:  Tongue is midline on protrusion    Motor:     She is moving all extremities but has some antalgic weakness secondary to widespread osteoarthritis.    DTR:  -Right   Bicep: 2+ Tricep: 2+ Brachoradialis: 2+   Patella: 2+ Ankle: 2+ Neg Babinski  -Left   Bicep: 2+ Tricep: 2+ Brachoradialis: 2+   Patella: 2+ Ankle: 2+ Neg Babinski    Sensory:  -Intact to light touch, pinprick, temperature, pain, and proprioception    Coordination:  -No gross ataxia        Results Review:  Lab Results (last 24 hours)     Procedure Component Value Units Date/Time    Basic Metabolic Panel [[id number]]  (Abnormal) Collected:  03/14/19 0612    Specimen:  Blood Updated:  03/14/19 0709     Glucose 95 mg/dL      BUN 15 mg/dL      Creatinine 0.68 mg/dL      Sodium 139 mmol/L      Potassium 3.7 mmol/L      Chloride 98 mmol/L      CO2 35.0 mmol/L      Calcium 9.8 mg/dL      eGFR Non African Amer 81 mL/min/1.73      BUN/Creatinine Ratio 22.1     Anion Gap 6.0 mmol/L     Narrative:       GFR Normal >60  Chronic Kidney Disease <60  Kidney Failure <15    Lipid Panel [[id number]]  (Abnormal) Collected:  03/14/19 0612    Specimen:  Blood Updated:  03/14/19 0709     Total Cholesterol 110 mg/dL      Triglycerides 46 mg/dL      HDL Cholesterol 48 mg/dL      LDL Cholesterol  51  mg/dL      LDL/HDL Ratio 1.10    Hemoglobin A1c [[id number]] Collected:  03/14/19 0612    Specimen:  Blood Updated:  03/14/19 0708     Hemoglobin A1C 5.0 %     Narrative:       Less than 6.0           Non-Diabetic Range  6.0-7.0                 ADA Therapeutic Target  Greater than 7.0        Action Suggested    CBC (No Diff) [[id number]]  (Abnormal) Collected:  03/14/19 0612    Specimen:  Blood Updated:  03/14/19 0635     WBC 4.64 10*3/mm3      RBC 3.46 10*6/mm3      Hemoglobin 9.9 g/dL      Hematocrit 30.9 %      MCV 89.3 fL      MCH 28.6 pg      MCHC 32.0 g/dL      RDW 13.7 %      RDW-SD 44.8 fl      MPV 12.1 fL      Platelets 226 10*3/mm3     [place] Draw [[id number]] Collected:  03/13/19 1709    Specimen:  Blood Updated:  03/13/19 1816    Narrative:       The following orders were created for panel order [facility] [facility].  Procedure                               Abnormality         Status                     ---------                               -----------         ------                     Light Blue Top[[id number]]                                    Final result               Green Top (Gel)[[id number]]                                   Final result               Lavender Top[[id number]]                                      Final result               Red Top[[id number]]                                           Final result                 Please view results for these tests on the individual orders.    Light Blue Top [[id number]] Collected:  03/13/19 1709    Specimen:  Blood Updated:  03/13/19 1816     Extra Tube hold for add-on     Comment: Auto resulted       Green Top (Gel) [[id number]] Collected:  03/13/19 1709    Specimen:  Blood Updated:  03/13/19 1816     Extra Tube Hold for add-ons.     Comment: Auto resulted.       Lavender Top [[id number]] Collected:  03/13/19 1709    Specimen:  Blood Updated:  03/13/19 1816     Extra Tube hold for add-on     Comment: Auto resulted       Red Top [[id number]] Collected:  03/13/19 1709     Specimen:  Blood Updated:  03/13/19 1816     Extra Tube Hold for add-ons.     Comment: Auto resulted.       Comprehensive Metabolic Panel [[id number]]  (Abnormal) Collected:  03/13/19 1709    Specimen:  Blood Updated:  03/13/19 1739     Glucose 98 mg/dL      BUN 17 mg/dL      Creatinine 0.77 mg/dL      Sodium 134 mmol/L      Potassium 3.2 mmol/L      Chloride 91 mmol/L      CO2 35.0 mmol/L      Calcium 10.4 mg/dL      Total Protein 6.2 g/dL      Albumin 3.90 g/dL      ALT (SGPT) 36 U/L      AST (SGOT) 73 U/L      Alkaline Phosphatase 91 U/L      Total Bilirubin 0.8 mg/dL      eGFR Non African Amer 70 mL/min/1.73      Globulin 2.3 gm/dL      A/G Ratio 1.7 g/dL      BUN/Creatinine Ratio 22.1     Anion Gap 8.0 mmol/L     Narrative:       GFR Normal >60  Chronic Kidney Disease <60  Kidney Failure <15    Protime-INR [[id number]]  (Normal) Collected:  03/13/19 1709    Specimen:  Blood Updated:  03/13/19 1736     Protime 12.6 Seconds      INR 0.92    CBC & Differential [[id number]] Collected:  03/13/19 1709    Specimen:  Blood Updated:  03/13/19 1726    Narrative:       The following orders were created for panel order CBC & Differential.  Procedure                               Abnormality         Status                     ---------                               -----------         ------                     CBC Auto Differential[[id number]]         Abnormal            Final result                 Please view results for these tests on the individual orders.    CBC Auto Differential [[id number]]  (Abnormal) Collected:  03/13/19 1709    Specimen:  Blood Updated:  03/13/19 1726     WBC 6.64 10*3/mm3      RBC 3.98 10*6/mm3      Hemoglobin 11.2 g/dL      Hematocrit 35.0 %      MCV 87.9 fL      MCH 28.1 pg      MCHC 32.0 g/dL      RDW 13.6 %      RDW-SD 44.0 fl      MPV 12.0 fL      Platelets 252 10*3/mm3      Neutrophil % 40.7 %      Lymphocyte % 39.3 %      Monocyte % 13.1 %      Eosinophil % 6.2 %      Basophil % 0.5 %      Immature  Grans % 0.2 %      Neutrophils, Absolute 2.71 10*3/mm3      Lymphocytes, Absolute 2.61 10*3/mm3      Monocytes, Absolute 0.87 10*3/mm3      Eosinophils, Absolute 0.41 10*3/mm3      Basophils, Absolute 0.03 10*3/mm3      Immature Grans, Absolute 0.01 10*3/mm3      nRBC 0.0 /100 WBC           .  Imaging Results (last 24 hours)     Procedure Component Value Units Date/Time    US Carotid Bilateral [[id number]] Updated:  03/14/19 0736    XR Hip With or Without Pelvis 2 - 3 View Right [[id number]] Collected:  03/13/19 2223     Updated:  03/13/19 2227    Narrative:       EXAMINATION:  XR HIP W OR WO PELVIS 2-3 VIEW RIGHT-  3/13/2019 7:59 PM  CDT     HISTORY: fall, hip pain. With pelvis; I63.9-Cerebral infarction,  unspecified; R55-Syncope and collapse      COMPARISON: No comparison study.     TECHNIQUE: 3 views: AP pelvis, AP and lateral right hip     FINDINGS:      There are osteoarthritic changes observed in the right hip. There is  osteophyte formation along the acetabulum and femoral head neck  junction. There is no fracture or dislocation.     Degenerative changes to lesser noted in the left hip. There is no  fracture.     The bony pelvis is intact.     Advanced degenerative changes noted in the lower lumbar spine.       Impression:       1. Degenerative changes.  2. No acute osseous abnormality.  This report was finalized on 03/13/2019 22:24 by Dr. Tereso Brothers MD.    XR Elbow 2 View Right [[id number]] Collected:  03/13/19 2221     Updated:  03/13/19 2226    Narrative:       EXAMINATION:  XR ELBOW 2 VW RIGHT-  3/13/2019 7:58 PM CDT     HISTORY: Fall, pain; I63.9-Cerebral infarction, unspecified; R55-Syncope  and collapse      COMPARISON: No comparison study.     TECHNIQUE: 2 views: AP and lateral projection imaging     FINDINGS:      There is mild linear bony irregularity appreciated on the lateral view,  near the coronoid process which may reflect degenerative spurring there  is observed on the frontal view. Minimal  avulsion injury not excluded.  No additional fracture identified.     Joint effusion questioned.       Impression:       1. Question mild fracture changes noted in the region of the coronoid  process on the lateral view. Correlate with physical findings.  2. Joint effusion suspected.  This report was finalized on 03/13/2019 22:23 by Dr. Tereso Brothers MD.    CT Head Without Contrast [[id number]] Collected:  03/13/19 2146     Updated:  03/13/19 2156    Narrative:       EXAMINATION: CT HEAD WO CONTRAST-  3/13/2019 9:46 PM CDT     CT SCAN OF THE HEAD, WITHOUT CONTRAST:      HISTORY: Stroke observed on outside MR     COMPARISONS: 7/31/2016 head CT      TECHNIQUE:     Radiation dose equals  mGy-cm.  Automated exposure control dose  reduction technique was implemented.        CT evaluation of the head without intravenous contrast. 5 mm transaxial  images were obtained.   2-D sagittal and coronal reconstruction images  were generated.     FINDINGS:      Along the right temporal lobe cortex anteriorly and the insular cortex,  anteriorly near the sylvian fissure there is increased attenuation and  mild cortical hemorrhage questioned. Correlate with outside MR findings.     There is no additional CT changes of acute hemorrhage.     There is central and cortical atrophy. There is low-attenuation changes  in the periventricular and subcortical white matter compatible with  chronic ischemic change.     There is no mass effect or midline shift.     There is no hydrocephalus.     The paranasal sinuses are clear.     No acute osseous abnormalities identified.             Impression:       1. Subtle increased attenuation worrisome for mild cortical hemorrhage  in the right temporal lobe anteriorly and along the sylvian fissure in  the insular cortex. Correlate with patient presentation and outside MR  imaging   2. Atrophy and chronic ischemic changes.           This report was finalized on 03/13/2019 21:52 by Dr. Tereso Brothers  MD.    CT Cervical Spine Without Contrast [[id number]] Collected:  03/13/19 2133     Updated:  03/13/19 2139    Narrative:       EXAMINATION:  CT CERVICAL SPINE WO CONTRAST-  3/13/2019 7:33 PM CDT     HISTORY: fall; I63.9-Cerebral infarction, unspecified; R55-Syncope and  collapse      COMPARISON: No comparison study.     TECHNIQUE:      Radiation dose equals  mGy-cm.  Automated exposure control dose  reduction technique was implemented.     Thin section axial images were obtained. 2-D sagittal coronal  reconstructions were generated.     FINDINGS:      There is marked lordosis of the cervical spine with marked kyphosis of  the thoracic spine.     The facets are appropriately aligned.     Vertebral body heights are maintained without definite fracture or acute  subluxation.     There is multilevel disc degeneration particularly C4-C5 with mild  anterolisthesis of C4 on C5. There is disc degeneration at C6-C7 with  vacuum phenomenon with minimal anterolisthesis of C6 on C7.     There is mild anterolisthesis of C7 on T1.     There is no significant canal stenosis.     There is no CT evidence of posttraumatic disc herniation.     There are degenerative changes in the density 1 articulation with  osteophyte formation.       Impression:       1. No CT evidence of acute fracture/subluxation of the cervical spine.  2. Marked lordosis of the cervical spine and kyphosis of the thoracic  spine.  3. Multilevel Disc degeneration and spondylosis.  This report was finalized on 03/13/2019 21:36 by Dr. Tereso Brothers MD.        CT of head reviewed by me.  In addition to that the MRI of the brain is reviewed as well.  Is negative for diffusion ab normalities.  There is a small hyper focus of blood in the right sylvian fissure likely consistent with subacute blood.  There is no mass-effect.      Impression    89-year-old female with end-stage dementia presenting with likely a small subarachnoid hemorrhage secondary to a fall.  I  see no evidence of acute ischemic strokes.  From a neurology perspective I recommend that the patient have 24-hour supervision moving forward secondary to her severe dementia.  I also recommend discharge back to her normal setting as quickly as possible as I think her chance of inpatient psychosis is externally high.  No further stroke workup warranted at this time.    Plan    · Cleared from neurology standpoint to be discharged home    I discussed the patients findings and my recommendations with patient and family    Tay Casey MD  03/14/19  11:11 AM

## 2019-03-14 NOTE — DISCHARGE SUMMARY
[facility] Medicine Services  DISCHARGE SUMMARY       Date of Admission: 3/13/2019  Date of Discharge:  3/14/2019  Primary Care Physician: Provider, No Known    Presenting Problem/History of Present Illness:  Fall, likely secondary to dementia    Final Discharge Diagnoses:  Active Hospital Problems    Diagnosis   • Intracranial space-occupying lesion     Favor venous subarachnoid hemorrhage, traumatic hemorrhage, or cerebral contusion per neurosurgery         • Dementia of the Alzheimer's type with late onset without behavioral disturbance   • Closed fracture of coronoid process of right ulna   • HTN (hypertension)   • Fall   • Glaucoma   • Hypokalemia   • Cerebrovascular accident (CVA) (CMS/[facility])     Consults:   1.  Dr. Mario Alberto Bailey-neurosurgery  2.  Dr. Tay Casey-neurology  3.  Dr. Will Jamison-orthopedic surgery    Procedures Performed: None    Pertinent Test Results:   Lab Results (all)     Procedure Component Value Units Date/Time    Basic Metabolic Panel [[id number]]  (Abnormal) Collected:  03/14/19 0612    Specimen:  Blood Updated:  03/14/19 0709     Glucose 95 mg/dL      BUN 15 mg/dL      Creatinine 0.68 mg/dL      Sodium 139 mmol/L      Potassium 3.7 mmol/L      Chloride 98 mmol/L      CO2 35.0 mmol/L      Calcium 9.8 mg/dL      eGFR Non African Amer 81 mL/min/1.73      BUN/Creatinine Ratio 22.1     Anion Gap 6.0 mmol/L     Lipid Panel [[id number]]  (Abnormal) Collected:  03/14/19 0612    Specimen:  Blood Updated:  03/14/19 0709     Total Cholesterol 110 mg/dL      Triglycerides 46 mg/dL      HDL Cholesterol 48 mg/dL      LDL Cholesterol  51 mg/dL      LDL/HDL Ratio 1.10    Hemoglobin A1c [[id number]] Collected:  03/14/19 0612    Specimen:  Blood Updated:  03/14/19 0708     Hemoglobin A1C 5.0 %     CBC (No Diff) [[id number]]  (Abnormal) Collected:  03/14/19 0612    Specimen:  Blood Updated:  03/14/19 0635     WBC 4.64 10*3/mm3      RBC 3.46 10*6/mm3      Hemoglobin 9.9  g/dL      Hematocrit 30.9 %      MCV 89.3 fL      MCH 28.6 pg      MCHC 32.0 g/dL      RDW 13.7 %      RDW-SD 44.8 fl      MPV 12.1 fL      Platelets 226 10*3/mm3      Comment: Auto resulted.       Comprehensive Metabolic Panel [[id number]]  (Abnormal) Collected:  03/13/19 1709    Specimen:  Blood Updated:  03/13/19 1739     Glucose 98 mg/dL      BUN 17 mg/dL      Creatinine 0.77 mg/dL      Sodium 134 mmol/L      Potassium 3.2 mmol/L      Chloride 91 mmol/L      CO2 35.0 mmol/L      Calcium 10.4 mg/dL      Total Protein 6.2 g/dL      Albumin 3.90 g/dL      ALT (SGPT) 36 U/L      AST (SGOT) 73 U/L      Alkaline Phosphatase 91 U/L      Total Bilirubin 0.8 mg/dL      eGFR Non African Amer 70 mL/min/1.73      Globulin 2.3 gm/dL      A/G Ratio 1.7 g/dL      BUN/Creatinine Ratio 22.1     Anion Gap 8.0 mmol/L     Protime-INR [[id number]]  (Normal) Collected:  03/13/19 1709    Specimen:  Blood Updated:  03/13/19 1736     Protime 12.6 Seconds      INR 0.92    CBC Auto Differential [[id number]]  (Abnormal) Collected:  03/13/19 1709    Specimen:  Blood Updated:  03/13/19 1726     WBC 6.64 10*3/mm3      RBC 3.98 10*6/mm3      Hemoglobin 11.2 g/dL      Hematocrit 35.0 %      MCV 87.9 fL      MCH 28.1 pg      MCHC 32.0 g/dL      RDW 13.6 %      RDW-SD 44.0 fl      MPV 12.0 fL      Platelets 252 10*3/mm3      Neutrophil % 40.7 %      Lymphocyte % 39.3 %      Monocyte % 13.1 %      Eosinophil % 6.2 %      Basophil % 0.5 %      Immature Grans % 0.2 %      Neutrophils, Absolute 2.71 10*3/mm3      Lymphocytes, Absolute 2.61 10*3/mm3      Monocytes, Absolute 0.87 10*3/mm3      Eosinophils, Absolute 0.41 10*3/mm3      Basophils, Absolute 0.03 10*3/mm3      Immature Grans, Absolute 0.01 10*3/mm3      nRBC 0.0 /100 WBC         Imaging Results (all)     Procedure Component Value Units Date/Time    US Carotid Bilateral [[id number]] Collected:  03/14/19 1321     Updated:  03/14/19 1324    Narrative:       History: Carotid occlusive disease        Impression:       Impression:  1. There is less than 50% stenosis of the right internal carotid artery.  2. There is less than 50% stenosis of the left internal carotid artery.  3. Antegrade flow is demonstrated in bilateral vertebral arteries.     Comments: Bilateral carotid vertebral arterial duplex scan was  performed.     Grayscale imaging shows intimal thickening and calcified elements at the  carotid bifurcation. The right internal carotid artery peak systolic  velocity is 75.0 cm/sec. The end-diastolic velocity is 19.9 cm/sec. The  right ICA/CCA ratio is approximately 0.64 . These findings correlate  with less than 50% stenosis of the right internal carotid artery.     Grayscale imaging shows intimal thickening and calcified elements at the  carotid bifurcation. The left internal carotid artery peak systolic  velocity is 60.3 cm/sec. The end-diastolic velocity is 16.5 cm/sec. The  left ICA/CCA ratio is approximately 0.76 . These findings correlate with  less than 50% stenosis of the left internal carotid artery.     Antegrade flow is demonstrated in bilateral vertebral arteries.       This report was finalized on 03/14/2019 13:21 by Dr. Allen Salamanca MD.    XR Hip With or Without Pelvis 2 - 3 View Right [[id number]] Collected:  03/13/19 2223     Updated:  03/13/19 2227    Narrative:       EXAMINATION:  XR HIP W OR WO PELVIS 2-3 VIEW RIGHT-  3/13/2019 7:59 PM  CDT     HISTORY: fall, hip pain. With pelvis; I63.9-Cerebral infarction,  unspecified; R55-Syncope and collapse      COMPARISON: No comparison study.     TECHNIQUE: 3 views: AP pelvis, AP and lateral right hip     FINDINGS:      There are osteoarthritic changes observed in the right hip. There is  osteophyte formation along the acetabulum and femoral head neck  junction. There is no fracture or dislocation.     Degenerative changes to lesser noted in the left hip. There is no  fracture.     The bony pelvis is intact.     Advanced degenerative changes  noted in the lower lumbar spine.       Impression:       1. Degenerative changes.  2. No acute osseous abnormality.  This report was finalized on 03/13/2019 22:24 by Dr. Tereso Brothers MD.    XR Elbow 2 View Right [[id number]] Collected:  03/13/19 2221     Updated:  03/13/19 2226    Narrative:       EXAMINATION:  XR ELBOW 2 VW RIGHT-  3/13/2019 7:58 PM CDT     HISTORY: Fall, pain; I63.9-Cerebral infarction, unspecified; R55-Syncope  and collapse      COMPARISON: No comparison study.     TECHNIQUE: 2 views: AP and lateral projection imaging     FINDINGS:      There is mild linear bony irregularity appreciated on the lateral view,  near the coronoid process which may reflect degenerative spurring there  is observed on the frontal view. Minimal avulsion injury not excluded.  No additional fracture identified.     Joint effusion questioned.       Impression:       1. Question mild fracture changes noted in the region of the coronoid  process on the lateral view. Correlate with physical findings.  2. Joint effusion suspected.  This report was finalized on 03/13/2019 22:23 by Dr. Tereso Brothers MD.    CT Head Without Contrast [[id number]] Collected:  03/13/19 2146     Updated:  03/13/19 2156    Narrative:       EXAMINATION: CT HEAD WO CONTRAST-  3/13/2019 9:46 PM CDT     CT SCAN OF THE HEAD, WITHOUT CONTRAST:      HISTORY: Stroke observed on outside MR     COMPARISONS: 7/31/2016 head CT      TECHNIQUE:     Radiation dose equals  mGy-cm.  Automated exposure control dose  reduction technique was implemented.        CT evaluation of the head without intravenous contrast. 5 mm transaxial  images were obtained.   2-D sagittal and coronal reconstruction images  were generated.     FINDINGS:      Along the right temporal lobe cortex anteriorly and the insular cortex,  anteriorly near the sylvian fissure there is increased attenuation and  mild cortical hemorrhage questioned. Correlate with outside MR findings.     There is no  additional CT changes of acute hemorrhage.     There is central and cortical atrophy. There is low-attenuation changes  in the periventricular and subcortical white matter compatible with  chronic ischemic change.     There is no mass effect or midline shift.     There is no hydrocephalus.     The paranasal sinuses are clear.     No acute osseous abnormalities identified.             Impression:       1. Subtle increased attenuation worrisome for mild cortical hemorrhage  in the right temporal lobe anteriorly and along the sylvian fissure in  the insular cortex. Correlate with patient presentation and outside MR  imaging   2. Atrophy and chronic ischemic changes.           This report was finalized on 03/13/2019 21:52 by Dr. Tereso Brothers MD.    CT Cervical Spine Without Contrast [[id number]] Collected:  03/13/19 2133     Updated:  03/13/19 2139    Narrative:       EXAMINATION:  CT CERVICAL SPINE WO CONTRAST-  3/13/2019 7:33 PM CDT     HISTORY: fall; I63.9-Cerebral infarction, unspecified; R55-Syncope and  collapse      COMPARISON: No comparison study.     TECHNIQUE:      Radiation dose equals  mGy-cm.  Automated exposure control dose  reduction technique was implemented.     Thin section axial images were obtained. 2-D sagittal coronal  reconstructions were generated.     FINDINGS:      There is marked lordosis of the cervical spine with marked kyphosis of  the thoracic spine.     The facets are appropriately aligned.     Vertebral body heights are maintained without definite fracture or acute  subluxation.     There is multilevel disc degeneration particularly C4-C5 with mild  anterolisthesis of C4 on C5. There is disc degeneration at C6-C7 with  vacuum phenomenon with minimal anterolisthesis of C6 on C7.     There is mild anterolisthesis of C7 on T1.     There is no significant canal stenosis.     There is no CT evidence of posttraumatic disc herniation.     There are degenerative changes in the density 1  articulation with  osteophyte formation.       Impression:       1. No CT evidence of acute fracture/subluxation of the cervical spine.  2. Marked lordosis of the cervical spine and kyphosis of the thoracic  spine.  3. Multilevel Disc degeneration and spondylosis.  This report was finalized on 03/13/2019 21:36 by Dr. Tereso Brothers MD.        History of Present Illness on Day of Discharge: The patient is resting in bed with daughter at bedside.  Her daughter reports that she is back at her baseline.     Hospital Course:  Ms. Bella is a very pleasant 89-year-old  female past medical history significant for hypertension, Alzheimer's, and osteoarthritis.  The patient's family found the patient following a fall at home yesterday.  She went to see her primary care provider and an MRI was ordered.  They felt this showed a subacute stroke and she was transferred to our facility on 3/13 for further evaluation.  She was admitted to the hospitalist service for further evaluation and management.    We did perform a CT of the head on admission, which showed subtle increased attenuation worrisome for mild cortical hemorrhage in the right temporal lobe.  CT of the cervical spine was negative for acute injury.  X-ray of the right hip was negative for acute injury.  X-ray of the right elbow showed questionable mild fracture changes noted in the region of the coronoid process.    The patient was seen in consultation by neurosurgery.  They do feel that the MRI was more consistent with intracranial lesion rather than an acute stroke.  They do favor venous subarachnoid hemorrhage, traumatic hemorrhage, or cerebral contusion.  This would be consistent given her fall.  They felt the risk outweighed the benefits of performing CTA to rule out aneurysm because if an aneurysm were found, no intervention would be undertaken given her age.  The patient was seen in consultation by orthopedic surgery as well.  There was no need for  "surgical intervention or splinting for the right coronoid chip fragment fracture to the right elbow.  The patient was seen in consultation by physical and occupational therapy who feel the patient is at her functional baseline.    The patient's family feels very strongly that the patient did not have a syncopal episode causing her fall.  We did offer to perform an echocardiogram to look for any valvular abnormalities which could have contributed to her fall.  Her family declines at this time, as no intervention would be pursued should she have any valvular abnormalities.    Overall, the patient is hemodynamically stable and appropriate for discharge home to resume home health services today.    Condition on Discharge:  Stable    Physical Exam on Discharge:  /61 (BP Location: Right arm, Patient Position: Lying)   Pulse 70   Temp 98.2 °F (36.8 °C) (Oral)   Resp 16   Ht 165.1 cm (65\")   Wt 62.1 kg (137 lb)   SpO2 91%   BMI 22.80 kg/m²   Physical Exam      Discharge Disposition:  Home-Health Care [facility]    Discharge Medications:     Discharge Medications      Changes to Medications      Instructions Start Date   pregabalin 150 MG capsule  Commonly known as:  LYRICA  What changed:  when to take this  Notes to patient:  Next dose due 3-14 tonight   150 mg, Oral, 2 Times Daily         Continue These Medications      Instructions Start Date   amLODIPine 10 MG tablet  Commonly known as:  NORVASC  Notes to patient:  Next dose due 3-15 am   10 mg, Oral, Daily      atenolol 25 MG tablet  Commonly known as:  TENORMIN  Notes to patient:  Next dose due 3-15 am   12.5 mg, Oral, Daily      calcium carbonate 600 MG tablet  Commonly known as:  OS-BARBIE  Notes to patient:  Next dose due 3-15 am   600 mg, Oral, Daily      donepezil 10 MG tablet  Commonly known as:  ARICEPT  Notes to patient:  Next dose due tonight 3-14   10 mg, Oral, Nightly      dorzolamide 2 % ophthalmic solution  Commonly known as:  TRUSOPT  Notes to patient: "  Next dose due tonight 3-14   1 drop, Both Eyes, 2 Times Daily      DULoxetine 60 MG capsule  Commonly known as:  CYMBALTA  Notes to patient:  Next dose due tonight 3-14   60 mg, Oral, 2 Times Daily      etodolac 400 MG tablet  Commonly known as:  LODINE  Notes to patient:  Next dose due tonight   400 mg, Oral, 2 Times Daily      hydrochlorothiazide 25 MG tablet  Commonly known as:  HYDRODIURIL  Notes to patient:  Next dose due 3-15 am   25 mg, Oral, Daily      latanoprost 0.005 % ophthalmic solution  Commonly known as:  XALATAN  Notes to patient:  Next dose due tonight 3-14   1 drop, Both Eyes, Nightly      multivitamin with minerals tablet tablet  Notes to patient:  Next dose due 3-15 am   1 tablet, Oral, Daily      omeprazole 20 MG capsule  Commonly known as:  priLOSEC  Notes to patient:  Next dose due 3-15 am   20 mg, Oral, Daily      potassium chloride 10 MEQ CR tablet  Commonly known as:  K-DUR  Notes to patient:  Next dose due tonight 3-14   10 mEq, Oral, 2 Times Daily      PROBIOTIC-10 capsule  Notes to patient:  Next dose due 3-15 am   1 capsule, Oral, Daily      XTAMPZA ER 18 MG capsule extended-release 12 hour   Generic drug:  oxyCODONE ER  Notes to patient:  Next dose due tonight 3-14   18 mg, Oral, Every 12 Hours           Discharge Diet:   Diet Instructions     Diet: Cardiac; Thin      Discharge Diet:  Cardiac    Fluid Consistency:  Thin        Activity at Discharge:   Activity Instructions     Activity as Tolerated          Discharge Care Plan/Instructions:   1.  Return for any acute or worsening symptoms  2.  Resume home health services for skilled nursing    Follow-up Appointments:   1.  Primary care provider in 1 week  2.  May follow-up with Dr. Bailey and Dr. Jamison as needed    Test Results Pending at Discharge: None    Linn Edgar, JUAN M  03/14/19  5:08 PM    Time: 25 minutes

## 2019-03-14 NOTE — CONSULTS
NEUROSURGERY INITIAL HOSPITAL ENCOUNTER    Assessment/Plan:   Felisha Bella is a 89 y.o. female with a significant comorbidity Alzheimer's.  She presents with a new problem of found down at home with worsening confusion. Physical exam findings of neurologically at her baseline with confusion but otherwise intact with diffuse weakness.  Their imaging shows hyperdensity in the right sylvian fissure consistent with blood products on her MRI.    Differential Diagnosis:   Altered mental status: Alzheimer's versus metabolic encephalopathy, favor Alzheimer's.  Intracranial lesion: Favor venous subarachnoid hemorrhage, traumatic hemorrhage, or cerebral contusion.  Much less likely cerebral aneurysm    Recommendations:  Felisha is a very pleasant 89-year-old female who is currently not on anticoagulation with a long-standing history of Alzheimer's.  She had a fall at home and was found down.  She appears to be at her neurological baseline.  Her intracranial lesion is not consistent with stroke but rather a small amount of venous subarachnoid hemorrhage.  This is consistent with a fall from standing height given her age and level of cortical atrophy.  While we cannot fully exclude aneurysm I believe that the risk of a CTA is not inconsequential.  Additionally even if an aneurysm is found, no intervention would be taking given her age and a 4% risk of rupture per year.  The family understands the risk and favor taking her home.  She is already beginning to show signs of confusion and disorientation, and has been some of her pain medication.  I believe the risk of progression of her bleed is very minimal and agree with discharge home at her earliest possible convenience.    I discussed the patients findings and my recommendations with patient, family and consulting provider    Thank you very much for this interesting consult.     Level of Risk: High due to:  illness with threat to life or bodily function and abrupt change in  neurological status  MDM: High Complexity  Mod = 12945, High=99223  ___________________________________________________________________    Reason for consult intracranial blood products    Chief Complaint:   Chief Complaint   Patient presents with   • Cerebrovascular Accident       HPI: Miss Bella is a 89 year old lady with a history of osteoarthritis, hypertension, and alzheimer's dementia.    She has a long-standing history of Alzheimer's.  However she does live alone with family support.  I will acquainted with family as I remove the tumor from her son-in-law.  Regardless she failed to respond to telephone calls yesterday and therefore her daughters came and checked on her.  She was found on the floor, more or less in her normal state of confusion.  She had urinated on herself.  She was taken to her primary care physician who ordered an MRI.  This was read as a subacute stroke but on my review is more consistent with subarachnoid venous hemorrhage in the sylvian fissure on the right.  There is no evidence of diffusion restriction.    Regardless given the imaging findings the patient was transferred to [facility] emergency room and was admitted to the hospitalist service for altered mental status and evaluation of stroke.  A CT scan was ordered which showed a subacute subarachnoid bleed in the right sylvian fissure.    Her daughters were at bedside and states that she is now at her baseline.  She does not complain of head pain.  She has no nausea or vomiting. She denies focal weakness of deficit.  She denies facial drooping or difficulty speaking.    She does have baseline confusion.  She is able to move all of her extremities without difficulty.  She is not on any anticoagulation.       Review of Systems   Constitutional: Negative.    HENT: Negative.    Eyes: Negative.    Respiratory: Negative.    Cardiovascular: Negative.    Gastrointestinal: Negative.    Endocrine: Negative.    Genitourinary: Negative.     Musculoskeletal: Positive for gait problem.   Skin: Negative.    Allergic/Immunologic: Negative.    Neurological: Positive for syncope.   Hematological: Negative.    Psychiatric/Behavioral: Positive for confusion.        Past Medical History:  has a past medical history of Alzheimer disease, Glaucoma, Hypertension, Macular degeneration, Osteoarthritis, and Sciatica.    Past Surgical History:  has no past surgical history on file.    Family History: family history includes Alzheimer's disease in her sister; Cancer in her brother; Heart disease in her brother and mother.    Social History:  reports that  has never smoked. She does not have any smokeless tobacco history on file. She reports that she does not drink alcohol or use drugs.    Allergies: Meperidine    Home Medications:   Current Facility-Administered Medications:   •  acetaminophen (TYLENOL) tablet 650 mg, 650 mg, Oral, Q4H PRN, Woeltz, Linn K, APRN, 650 mg at 03/13/19 2107  •  amLODIPine (NORVASC) tablet 5 mg, 5 mg, Oral, Daily, Woeltz, Linn K, APRN  •  atenolol (TENORMIN) tablet 12.5 mg, 12.5 mg, Oral, Daily, Woeltz, Linn K, APRN  •  atorvastatin (LIPITOR) tablet 40 mg, 40 mg, Oral, Nightly, Woeltz, Linn K, APRN, 40 mg at 03/13/19 2106  •  donepezil (ARICEPT) tablet 10 mg, 10 mg, Oral, Nightly, Woeltz, Linn K, APRN, 10 mg at 03/13/19 2106  •  dorzolamide (TRUSOPT) 2 % ophthalmic solution 1 drop, 1 drop, Both Eyes, TID, Woeltz, Linn K, APRN  •  DULoxetine (CYMBALTA) DR capsule 60 mg, 60 mg, Oral, Daily, Woeltz, Linn K, APRN  •  latanoprost (XALATAN) 0.005 % ophthalmic solution 1 drop, 1 drop, Both Eyes, Nightly, Woeltz, Linn K, APRN, 1 drop at 03/13/19 2107  •  ondansetron (ZOFRAN) injection 4 mg, 4 mg, Intravenous, Q6H PRN, Woeltz, Linn K, APRN  •  oxyCODONE ER (oxyCONTIN) 12 hr tablet 20 mg, 20 mg, Oral, Q12H, Mesfin Martines MD, 20 mg at 03/13/19 2241  •  pantoprazole (PROTONIX) EC tablet 40 mg, 40 mg, Oral, QAM,  Linn Edgar, APRN  •  pregabalin (LYRICA) capsule 150 mg, 150 mg, Oral, Q12H, Mesfin Martines MD, 150 mg at 03/13/19 2242  •  sodium chloride 0.9 % flush 3 mL, 3 mL, Intravenous, Q12H, Linn Edgar APRN, 3 mL at 03/13/19 2108  •  sodium chloride 0.9 % flush 3-10 mL, 3-10 mL, Intravenous, PRN, Linn Edgar, APRN    Medications: Scheduled Meds:  amLODIPine 5 mg Oral Daily   atenolol 12.5 mg Oral Daily   atorvastatin 40 mg Oral Nightly   donepezil 10 mg Oral Nightly   dorzolamide 1 drop Both Eyes TID   DULoxetine 60 mg Oral Daily   latanoprost 1 drop Both Eyes Nightly   oxyCODONE 20 mg Oral Q12H   pantoprazole 40 mg Oral QAM   pregabalin 150 mg Oral Q12H   sodium chloride 3 mL Intravenous Q12H     Continuous Infusions:   PRN Meds:.•  acetaminophen  •  ondansetron  •  sodium chloride    Vital Signs  Temp:  [98 °F (36.7 °C)-98.5 °F (36.9 °C)] 98.2 °F (36.8 °C)  Heart Rate:  [59-70] 70  Resp:  [16-18] 16  BP: (109-163)/(49-78) 143/61    Physical Exam  Physical Exam   Constitutional: She is oriented to person, place, and time. She appears cachectic.   Eyes: EOM are normal. Pupils are equal, round, and reactive to light.   Neurological: She is oriented to person, place, and time. She has a normal Finger-Nose-Finger Test, a normal Heel to Shea Test and a normal Tandem Gait Test.   Reflex Scores:       Tricep reflexes are 2+ on the right side and 2+ on the left side.       Bicep reflexes are 2+ on the right side and 2+ on the left side.       Brachioradialis reflexes are 2+ on the right side and 2+ on the left side.       Patellar reflexes are 2+ on the right side and 2+ on the left side.       Achilles reflexes are 2+ on the right side and 2+ on the left side.  Psychiatric: Her speech is normal.       Neurologic Exam     Mental Status   Oriented to person, place, and time.   Registration: recalls 1 of 3 objects. Recall at 5 minutes: recalls 1 of 3 objects.   Attention: decreased. Concentration:  decreased.   Speech: speech is normal   Level of consciousness: alert  Knowledge: consistent with education.   Able to name object. Able to read. Able to repeat. Abnormal comprehension.     Cranial Nerves     CN II   Visual acuity: normal    CN III, IV, VI   Pupils are equal, round, and reactive to light.  Extraocular motions are normal.   Diplopia: none    CN V   Facial sensation intact.   Right corneal reflex: normal  Left corneal reflex: normal    CN VII   Facial expression full, symmetric.   Right facial weakness: none  Left facial weakness: none    CN VIII   Hearing: intact    CN IX, X   Palate: symmetric  Right gag reflex: normal  Left gag reflex: normal    CN XI   Right trapezius strength: normal  Left trapezius strength: normal    CN XII   Tongue deviation: none    Motor Exam   Right arm tone: normal  Left arm tone: normal  Right arm pronator drift: absent  Left arm pronator drift: absent  Right leg tone: normal  Left leg tone: normal    Strength   Strength 5/5 except as noted.   Right deltoid: 4/5  Left deltoid: 4/5  Right biceps: 4/5  Left biceps: 4/5  Right triceps: 4/5  Left triceps: 4/5  Right iliopsoas: 4/5  Left iliopsoas: 4/5  Right quadriceps: 4/5  Left quadriceps: 4/5  Right anterior tibial: 4/5  Left anterior tibial: 4/5  Right gastroc: 4/5  Left gastroc: 4/5    Sensory Exam   Light touch normal.   Proprioception normal.     Gait, Coordination, and Reflexes     Gait  Gait: (Able to ambulate with a walker.)    Coordination   Finger to nose coordination: normal  Heel to shin coordination: normal  Tandem walking coordination: normal    Reflexes   Reflexes 2+ except as noted.   Right brachioradialis: 2+  Left brachioradialis: 2+  Right biceps: 2+  Left biceps: 2+  Right triceps: 2+  Left triceps: 2+  Right patellar: 2+  Left patellar: 2+  Right achilles: 2+  Left achilles: 2+  Right plantar: normal  Left plantar: normal  Right Rodrigues: absent  Left Rodrigues: absent      Results Review:   Independent  review and interpretation of imaging  Imaging Results (last 24 hours)     Procedure Component Value Units Date/Time    US Carotid Bilateral [[id number]] Updated:  03/14/19 0736    XR Hip With or Without Pelvis 2 - 3 View Right [[id number]] Collected:  03/13/19 2223     Updated:  03/13/19 2227    Narrative:       EXAMINATION:  XR HIP W OR WO PELVIS 2-3 VIEW RIGHT-  3/13/2019 7:59 PM  CDT     HISTORY: fall, hip pain. With pelvis; I63.9-Cerebral infarction,  unspecified; R55-Syncope and collapse      COMPARISON: No comparison study.     TECHNIQUE: 3 views: AP pelvis, AP and lateral right hip     FINDINGS:      There are osteoarthritic changes observed in the right hip. There is  osteophyte formation along the acetabulum and femoral head neck  junction. There is no fracture or dislocation.     Degenerative changes to lesser noted in the left hip. There is no  fracture.     The bony pelvis is intact.     Advanced degenerative changes noted in the lower lumbar spine.       Impression:       1. Degenerative changes.  2. No acute osseous abnormality.  This report was finalized on 03/13/2019 22:24 by Dr. Tereso Borthers MD.    XR Elbow 2 View Right [[id number]] Collected:  03/13/19 2221     Updated:  03/13/19 2226    Narrative:       EXAMINATION:  XR ELBOW 2 VW RIGHT-  3/13/2019 7:58 PM CDT     HISTORY: Fall, pain; I63.9-Cerebral infarction, unspecified; R55-Syncope  and collapse      COMPARISON: No comparison study.     TECHNIQUE: 2 views: AP and lateral projection imaging     FINDINGS:      There is mild linear bony irregularity appreciated on the lateral view,  near the coronoid process which may reflect degenerative spurring there  is observed on the frontal view. Minimal avulsion injury not excluded.  No additional fracture identified.     Joint effusion questioned.       Impression:       1. Question mild fracture changes noted in the region of the coronoid  process on the lateral view. Correlate with physical findings.  2.  Joint effusion suspected.  This report was finalized on 03/13/2019 22:23 by Dr. Tereso Brothers MD.    CT Head Without Contrast [[id number]] Collected:  03/13/19 2146     Updated:  03/13/19 2156    Narrative:       EXAMINATION: CT HEAD WO CONTRAST-  3/13/2019 9:46 PM CDT     CT SCAN OF THE HEAD, WITHOUT CONTRAST:      HISTORY: Stroke observed on outside MR     COMPARISONS: 7/31/2016 head CT      TECHNIQUE:     Radiation dose equals  mGy-cm.  Automated exposure control dose  reduction technique was implemented.        CT evaluation of the head without intravenous contrast. 5 mm transaxial  images were obtained.   2-D sagittal and coronal reconstruction images  were generated.     FINDINGS:      Along the right temporal lobe cortex anteriorly and the insular cortex,  anteriorly near the sylvian fissure there is increased attenuation and  mild cortical hemorrhage questioned. Correlate with outside MR findings.     There is no additional CT changes of acute hemorrhage.     There is central and cortical atrophy. There is low-attenuation changes  in the periventricular and subcortical white matter compatible with  chronic ischemic change.     There is no mass effect or midline shift.     There is no hydrocephalus.     The paranasal sinuses are clear.     No acute osseous abnormalities identified.             Impression:       1. Subtle increased attenuation worrisome for mild cortical hemorrhage  in the right temporal lobe anteriorly and along the sylvian fissure in  the insular cortex. Correlate with patient presentation and outside MR  imaging   2. Atrophy and chronic ischemic changes.           This report was finalized on 03/13/2019 21:52 by Dr. Tereso Brothers MD.    CT Cervical Spine Without Contrast [[id number]] Collected:  03/13/19 2133     Updated:  03/13/19 2139    Narrative:       EXAMINATION:  CT CERVICAL SPINE WO CONTRAST-  3/13/2019 7:33 PM CDT     HISTORY: fall; I63.9-Cerebral infarction, unspecified;  R55-Syncope and  collapse      COMPARISON: No comparison study.     TECHNIQUE:      Radiation dose equals  mGy-cm.  Automated exposure control dose  reduction technique was implemented.     Thin section axial images were obtained. 2-D sagittal coronal  reconstructions were generated.     FINDINGS:      There is marked lordosis of the cervical spine with marked kyphosis of  the thoracic spine.     The facets are appropriately aligned.     Vertebral body heights are maintained without definite fracture or acute  subluxation.     There is multilevel disc degeneration particularly C4-C5 with mild  anterolisthesis of C4 on C5. There is disc degeneration at C6-C7 with  vacuum phenomenon with minimal anterolisthesis of C6 on C7.     There is mild anterolisthesis of C7 on T1.     There is no significant canal stenosis.     There is no CT evidence of posttraumatic disc herniation.     There are degenerative changes in the density 1 articulation with  osteophyte formation.       Impression:       1. No CT evidence of acute fracture/subluxation of the cervical spine.  2. Marked lordosis of the cervical spine and kyphosis of the thoracic  spine.  3. Multilevel Disc degeneration and spondylosis.  This report was finalized on 03/13/2019 21:36 by Dr. Tereso Brothers MD.        MRI brain: Noncontrast MRI of the brain from outside hospital is reviewed.  There is evidence of subacute blood that is bright on both T2 and T1 in the right sylvian fissure.  This is associated with the middle cerebral artery.  This is most likely a venous subarachnoid hemorrhage although aneurysm cannot be ruled out.  There is no evidence of diffusion restriction or distal emboli.    MRI spine:   CT Head: Noncontrast CT of the head with hypodense to isodense lesion in the right sylvian fissure.  No evidence of hydrocephalus.  No evidence of skull fracture.    CT c-spine:  CT t-spine:  CT l-spine:  X-ray:    I reviewed the patient's new clinical  results.  Lab Results (last 24 hours)     Procedure Component Value Units Date/Time    Basic Metabolic Panel [[id number]]  (Abnormal) Collected:  03/14/19 0612    Specimen:  Blood Updated:  03/14/19 0709     Glucose 95 mg/dL      BUN 15 mg/dL      Creatinine 0.68 mg/dL      Sodium 139 mmol/L      Potassium 3.7 mmol/L      Chloride 98 mmol/L      CO2 35.0 mmol/L      Calcium 9.8 mg/dL      eGFR Non African Amer 81 mL/min/1.73      BUN/Creatinine Ratio 22.1     Anion Gap 6.0 mmol/L     Narrative:       GFR Normal >60  Chronic Kidney Disease <60  Kidney Failure <15    Lipid Panel [[id number]]  (Abnormal) Collected:  03/14/19 0612    Specimen:  Blood Updated:  03/14/19 0709     Total Cholesterol 110 mg/dL      Triglycerides 46 mg/dL      HDL Cholesterol 48 mg/dL      LDL Cholesterol  51 mg/dL      LDL/HDL Ratio 1.10    Hemoglobin A1c [[id number]] Collected:  03/14/19 0612    Specimen:  Blood Updated:  03/14/19 0708     Hemoglobin A1C 5.0 %     Narrative:       Less than 6.0           Non-Diabetic Range  6.0-7.0                 ADA Therapeutic Target  Greater than 7.0        Action Suggested    CBC (No Diff) [[id number]]  (Abnormal) Collected:  03/14/19 0612    Specimen:  Blood Updated:  03/14/19 0635     WBC 4.64 10*3/mm3      RBC 3.46 10*6/mm3      Hemoglobin 9.9 g/dL      Hematocrit 30.9 %      MCV 89.3 fL      MCH 28.6 pg      MCHC 32.0 g/dL      RDW 13.7 %      RDW-SD 44.8 fl      MPV 12.1 fL      Platelets 226 10*3/mm3     [facility] Draw [[id number]] Collected:  03/13/19 1709    Specimen:  Blood Updated:  03/13/19 1816    Narrative:       The following orders were created for panel order [facility] Draw.  Procedure                               Abnormality         Status                     ---------                               -----------         ------                     Light Blue Top[[id number]]                                    Final result               Green Top (Gel)[[id number]]                                   Final  result               Lavender Top[[id number]]                                      Final result               Red Top[[id number]]                                           Final result                 Please view results for these tests on the individual orders.    Light Blue Top [[id number]] Collected:  03/13/19 1709    Specimen:  Blood Updated:  03/13/19 1816     Extra Tube hold for add-on     Comment: Auto resulted       Green Top (Gel) [[id number]] Collected:  03/13/19 1709    Specimen:  Blood Updated:  03/13/19 1816     Extra Tube Hold for add-ons.     Comment: Auto resulted.       Lavender Top [[id number]] Collected:  03/13/19 1709    Specimen:  Blood Updated:  03/13/19 1816     Extra Tube hold for add-on     Comment: Auto resulted       Red Top [[id number]] Collected:  03/13/19 1709    Specimen:  Blood Updated:  03/13/19 1816     Extra Tube Hold for add-ons.     Comment: Auto resulted.       Comprehensive Metabolic Panel [[id number]]  (Abnormal) Collected:  03/13/19 1709    Specimen:  Blood Updated:  03/13/19 1739     Glucose 98 mg/dL      BUN 17 mg/dL      Creatinine 0.77 mg/dL      Sodium 134 mmol/L      Potassium 3.2 mmol/L      Chloride 91 mmol/L      CO2 35.0 mmol/L      Calcium 10.4 mg/dL      Total Protein 6.2 g/dL      Albumin 3.90 g/dL      ALT (SGPT) 36 U/L      AST (SGOT) 73 U/L      Alkaline Phosphatase 91 U/L      Total Bilirubin 0.8 mg/dL      eGFR Non African Amer 70 mL/min/1.73      Globulin 2.3 gm/dL      A/G Ratio 1.7 g/dL      BUN/Creatinine Ratio 22.1     Anion Gap 8.0 mmol/L     Narrative:       GFR Normal >60  Chronic Kidney Disease <60  Kidney Failure <15    Protime-INR [[id number]]  (Normal) Collected:  03/13/19 1709    Specimen:  Blood Updated:  03/13/19 1736     Protime 12.6 Seconds      INR 0.92    CBC & Differential [[id number]] Collected:  03/13/19 1709    Specimen:  Blood Updated:  03/13/19 1726    Narrative:       The following orders were created for panel order CBC & Differential.  Procedure                                Abnormality         Status                     ---------                               -----------         ------                     CBC Auto Differential[[id number]]         Abnormal            Final result                 Please view results for these tests on the individual orders.    CBC Auto Differential [[id number]]  (Abnormal) Collected:  03/13/19 1709    Specimen:  Blood Updated:  03/13/19 1726     WBC 6.64 10*3/mm3      RBC 3.98 10*6/mm3      Hemoglobin 11.2 g/dL      Hematocrit 35.0 %      MCV 87.9 fL      MCH 28.1 pg      MCHC 32.0 g/dL      RDW 13.6 %      RDW-SD 44.0 fl      MPV 12.0 fL      Platelets 252 10*3/mm3      Neutrophil % 40.7 %      Lymphocyte % 39.3 %      Monocyte % 13.1 %      Eosinophil % 6.2 %      Basophil % 0.5 %      Immature Grans % 0.2 %      Neutrophils, Absolute 2.71 10*3/mm3      Lymphocytes, Absolute 2.61 10*3/mm3      Monocytes, Absolute 0.87 10*3/mm3      Eosinophils, Absolute 0.41 10*3/mm3      Basophils, Absolute 0.03 10*3/mm3      Immature Grans, Absolute 0.01 10*3/mm3      nRBC 0.0 /100 WBC           Silverio Bailey MD

## 2019-03-14 NOTE — PLAN OF CARE
Problem: Patient Care Overview  Goal: Plan of Care Review  Outcome: Ongoing (interventions implemented as appropriate)   03/14/19 6436   Coping/Psychosocial   Plan of Care Reviewed With patient   Plan of Care Review   Progress improving   OTHER   Outcome Summary Pt sat up in chair and walked in room with assistance. History of dementia. Family at bedside.       Problem: Fall Risk (Adult)  Goal: Absence of Fall  Outcome: Ongoing (interventions implemented as appropriate)      Problem: Stroke (Ischemic) (Adult)  Goal: Signs and Symptoms of Listed Potential Problems Will be Absent, Minimized or Managed (Stroke)  Outcome: Ongoing (interventions implemented as appropriate)

## 2019-03-14 NOTE — PLAN OF CARE
Problem: Patient Care Overview  Goal: Plan of Care Review  Outcome: Ongoing (interventions implemented as appropriate)   03/14/19 1054   Coping/Psychosocial   Plan of Care Reviewed With patient;daughter   Plan of Care Review   Progress no change   OTHER   Outcome Summary PT screen performed. Pt family reports that patient is at baseline and going home today. Pt has no DME needs and will be going home with home health and caregiver present at night. PT to sign off at this time. Reconsult if status changes.

## 2019-03-15 ENCOUNTER — READMISSION MANAGEMENT (OUTPATIENT)
Dept: CALL CENTER | Facility: HOSPITAL | Age: 84
End: 2019-03-15

## 2019-03-15 NOTE — OUTREACH NOTE
Prep Survey      Responses   Facility patient discharged from?  [place]   Is patient eligible?  No   What are the reasons patient is not eligible?  Other   Does the patient have one of the following disease processes/diagnoses(primary or secondary)?  Other   Prep survey completed?  Yes          Lexus Michaud RN

## 2019-07-11 ENCOUNTER — TELEPHONE (OUTPATIENT)
Dept: NEUROSURGERY | Facility: CLINIC | Age: 84
End: 2019-07-11

## 2019-07-11 NOTE — TELEPHONE ENCOUNTER
Received call from patient's emergency contact, Kimi, she advised me that the patient has had a new fall and was evaluated at their local ED. The daughter is concerned regarding the mothers confusion, balance difficulty and increased sleeping. She advised me that she is mailing her imaging from outside hospital and would like for Dr. Bailey to review. This patient has not been seen in our office, she was only a hospital consult that did not require follow up. Will review imaging once obtained and contact daughter with recommendations. She voiced understanding.        Previously seen right temporal lobe traumatic subarachnoid hemorrhage is completely resolved.  Ventricular dilation but this is not out of proportion to cortical atrophy.  She does have some minor ventriculomegaly.  Could be a potential candidate for normal pressure hydrocephalus except the patient is 89 years old.  Will discuss with daughter.    Silverio Bailey MD

## 2019-07-22 NOTE — TELEPHONE ENCOUNTER
Dr. Bailey has contacted the patient's daughter, and discussed CT findings with her. He recommends they follow up with Neurology. Daughter is to contact PCP for request of referral, she is to contact us if she needs assistance with this.